# Patient Record
Sex: FEMALE | Race: WHITE | Employment: FULL TIME | URBAN - METROPOLITAN AREA
[De-identification: names, ages, dates, MRNs, and addresses within clinical notes are randomized per-mention and may not be internally consistent; named-entity substitution may affect disease eponyms.]

---

## 2022-08-28 ENCOUNTER — HOSPITAL ENCOUNTER (EMERGENCY)
Age: 52
Discharge: HOME | End: 2022-08-28
Attending: EMERGENCY MEDICINE
Payer: COMMERCIAL

## 2022-08-28 VITALS
SYSTOLIC BLOOD PRESSURE: 137 MMHG | RESPIRATION RATE: 20 BRPM | DIASTOLIC BLOOD PRESSURE: 100 MMHG | HEART RATE: 89 BPM | WEIGHT: 142.8 LBS | OXYGEN SATURATION: 100 %

## 2022-08-28 DIAGNOSIS — S60.449A TIGHT RING ON FINGER: Primary | ICD-10-CM
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
## 2022-08-28 PROCEDURE — 99282 EMERGENCY DEPT VISIT SF MDM: CPT

## 2022-08-28 RX ORDER — LEVOTHYROXINE, LIOTHYRONINE 38; 9 UG/1; UG/1
TABLET ORAL
COMMUNITY
Start: 2022-06-30

## 2022-08-28 RX ORDER — CHOLECALCIFEROL (VITAMIN D3) 125 MCG
CAPSULE ORAL
COMMUNITY
Start: 2022-06-20

## 2022-08-28 ASSESSMENT — PAIN - FUNCTIONAL ASSESSMENT: PAIN_FUNCTIONAL_ASSESSMENT: 0-10

## 2022-08-28 NOTE — ED PROVIDER NOTES
[address]      Pt Name: Sophia Minaya  MRN: [id number]  [place] 1970  Date of evaluation: 8/28/2022  Provider: Royce Dance, MD    CHIEF COMPLAINT       Chief Complaint   Patient presents with    Ring Removal     Pt c/o ring stuck L ring finger. HISTORY OF PRESENT ILLNESS   (Location/Symptom, Timing/Onset, Context/Setting, Quality, Duration, Modifying Factors, Severity)  Note limiting factors. Sophia Minaya is a 46 y.o. female with past medical history ofrelated long-term illness here today after ring was stuck on her finger. Patient was at a nearby grocery store when she placed a ring on her left ring finger. Unfortunately, she was unable to get the ring off and has presented for help and assistance. No significant pain. She notes a mild throbbing in the left ring finger. Denies recent trauma or injury. She states she placed a bag of frozen strawberries on her hand to help with the swelling. \[facility]\""    Nursing Notes were reviewed. REVIEW OF SYSTEMS    (2-9 systems for level 4, 10 or more for level 5)     Review of Systems    Please see HPI for pertinent positive and negative review of system findings. A full 10 system ROS was performed and otherwise negative. PAST MEDICAL HISTORY   History reviewed. No pertinent past medical history. SURGICAL HISTORY     History reviewed. No pertinent surgical history. CURRENT MEDICATIONS       Discharge Medication List as of 8/28/2022  9:28 AM        CONTINUE these medications which have NOT CHANGED    Details   NP THYROID 60 MG tablet TAKE 1 TABLET BY MOUTH EVERY DAY, DAWHistorical Med      Cholecalciferol (VITAMIN D3) 50 MCG (2000 UT) TABS TAKE 1 TABLET BY MOUTH EVERY DAYHistorical Med             ALLERGIES     Patient has no known allergies. FAMILY HISTORY     History reviewed. No pertinent family history.        SOCIAL HISTORY       Social History     Socioeconomic History removed without any trauma to the underlying digit. She has normal range of motion of the great capillary refill and may be discharged home    Monica SANTOYO M.D., am the primary clinician of record. MDM      CONSULTS     None    Critical Care:   None    REASSESSMENT          PROCEDURE     Unless otherwise noted below, none     Procedures      FINAL IMPRESSION      1. Tight ring on finger            DISPOSITION/PLAN   DISPOSITION Decision To Discharge 08/28/2022 09:16:17 AM        PATIENT REFERRED TO:  [facility] [address] [address]  [phone]    As needed      DISCHARGE MEDICATIONS:  Discharge Medication List as of 8/28/2022  9:28 AM        Controlled Substances Monitoring:     No flowsheet data found.     (Please note that portions of this note were completed with a voice recognition program.  Efforts were made to edit the dictations but occasionally words are mis-transcribed.)    Monica Millan MD (electronically signed)  Attending Emergency Physician            Olya Rey MD  08/28/22 1384

## 2023-06-19 ENCOUNTER — OFFICE VISIT (OUTPATIENT)
Dept: ORTHOPEDIC SURGERY | Age: 53
End: 2023-06-19
Payer: COMMERCIAL

## 2023-06-19 ENCOUNTER — TELEPHONE (OUTPATIENT)
Dept: ORTHOPEDIC SURGERY | Age: 53
End: 2023-06-19

## 2023-06-19 VITALS — WEIGHT: 142 LBS

## 2023-06-19 DIAGNOSIS — M25.561 RIGHT KNEE PAIN, UNSPECIFIED CHRONICITY: Primary | ICD-10-CM

## 2023-06-19 PROCEDURE — 99204 OFFICE O/P NEW MOD 45 MIN: CPT | Performed by: ORTHOPAEDIC SURGERY

## 2023-06-19 NOTE — TELEPHONE ENCOUNTER
Same Day Appt Add On Time     Appointment time:  3:15 PM    AWARE TO ARRIVE EARLY  AWARE TO BRING ALL RECORDS & IMAGES IF NOT PATIENT WILL CALL TO RESCHEDULE

## 2023-06-29 ENCOUNTER — OFFICE VISIT (OUTPATIENT)
Dept: ORTHOPEDIC SURGERY | Age: 53
End: 2023-06-29

## 2023-06-29 VITALS — BODY MASS INDEX: 22.29 KG/M2 | HEIGHT: 67 IN | WEIGHT: 142 LBS

## 2023-06-29 DIAGNOSIS — M23.51 OLD COMPLETE TEAR OF ANTERIOR CRUCIATE LIGAMENT OF RIGHT KNEE: Primary | ICD-10-CM

## 2023-07-05 ENCOUNTER — TELEPHONE (OUTPATIENT)
Dept: ORTHOPEDIC SURGERY | Age: 53
End: 2023-07-05

## 2023-07-05 DIAGNOSIS — M25.561 RIGHT KNEE PAIN, UNSPECIFIED CHRONICITY: ICD-10-CM

## 2023-07-05 DIAGNOSIS — M23.51 OLD COMPLETE TEAR OF ANTERIOR CRUCIATE LIGAMENT OF RIGHT KNEE: Primary | ICD-10-CM

## 2023-07-05 NOTE — TELEPHONE ENCOUNTER
September 15TH. FMLA/DISABILITY BENEFITS DO NOT KICK IN UNTIL September. I LET PATIENT KNOW I WOULD CONFIRM THE PLAN WITH DR. Ash Aguilar. SHE STATED SIDRA WOULD MAYBE WANT REPEAT MRI SINCE HAVING TO WAIT. IF SO, SHE WOULD LIKE TO GO TO  IMAGING- [facility]. I LET PATIENT KNOW WE WOULD F/U ON Friday AFTER CONFIRMING WITH DR. Ash Aguilar.

## 2023-07-05 NOTE — TELEPHONE ENCOUNTER
PATIENT LS DR. Alessio Beltran.  SHE WANTS ORDER FOR PT. I WILL PLACE ORDER FOR PT FOR [place] OFFICE. LEFT PATIENT NUMBER FOR PT AT [facility]. ASKED SHE F/U THAT SHE GOT THE PT INFO AND IF SHE WOULD LIKE TO [address] F/U WITH DR. VALENTE. LEFT OUR DIRECT LINE FOR PATIENT TO RETURN CALL.

## 2023-07-07 NOTE — TELEPHONE ENCOUNTER
SPOKE WITH PATIENT. LET HER KNOW WE ARE A GO FOR 9/15. SHE WILL GET HER SX PACKET AT HER PRE-OP APPT ON 8/14. ALL QUESTIONS ANSWERED. PATIENT EXPRESSED UNDERSTANDING.

## 2023-07-10 ENCOUNTER — HOSPITAL ENCOUNTER (OUTPATIENT)
Dept: PHYSICAL THERAPY | Age: 53
Setting detail: THERAPIES SERIES
Discharge: HOME | End: 2023-07-10
Payer: COMMERCIAL

## 2023-07-10 PROCEDURE — 97161 PT EVAL LOW COMPLEX 20 MIN: CPT

## 2023-07-10 PROCEDURE — 97112 NEUROMUSCULAR REEDUCATION: CPT

## 2023-07-10 NOTE — FLOWSHEET NOTE
María Elena Watkins, PT, DPT, SCS    Note: If patient does not return for scheduled/ recommended follow up visits, this note will serve as a discharge from care along with most recent update on progress.

## 2023-07-14 ENCOUNTER — HOSPITAL ENCOUNTER (OUTPATIENT)
Dept: PHYSICAL THERAPY | Age: 53
Setting detail: THERAPIES SERIES
Discharge: HOME | End: 2023-07-14
Payer: COMMERCIAL

## 2023-07-14 PROCEDURE — 97112 NEUROMUSCULAR REEDUCATION: CPT

## 2023-07-14 PROCEDURE — 97110 THERAPEUTIC EXERCISES: CPT

## 2023-07-14 NOTE — FLOWSHEET NOTE
EOT leg extension. She would continue to benefit from skilled physical therapy targeted at maximal quad strength improvements. PLAN: See eval  [] Continue per plan of care [] Alter current plan (see comments above)  [x] Plan of care initiated [] Hold pending MD visit [] Discharge    Electronically signed by:  Veronica Babb, PT, DPT, SCS    Note: If patient does not return for scheduled/ recommended follow up visits, this note will serve as a discharge from care along with most recent update on progress.

## 2023-07-18 ENCOUNTER — HOSPITAL ENCOUNTER (OUTPATIENT)
Dept: PHYSICAL THERAPY | Age: 53
Discharge: HOME | End: 2023-07-18
Payer: COMMERCIAL

## 2023-07-18 PROCEDURE — 97112 NEUROMUSCULAR REEDUCATION: CPT

## 2023-07-18 PROCEDURE — 97110 THERAPEUTIC EXERCISES: CPT

## 2023-07-18 NOTE — FLOWSHEET NOTE
ASSESSMENT: Yoel Manzano tolerated session well. She would continue to benefit from skilled physical therapy targeted at maximal quad strength improvements. PLAN: See eval  [] Continue per plan of care [] Alter current plan (see comments above)  [x] Plan of care initiated [] Hold pending MD visit [] Discharge    Electronically signed by:  Raina Goodwin, PT, DPT, SCS, Stiven Albright SPT    Note: If patient does not return for scheduled/ recommended follow up visits, this note will serve as a discharge from care along with most recent update on progress.

## 2023-07-20 ENCOUNTER — HOSPITAL ENCOUNTER (OUTPATIENT)
Dept: PHYSICAL THERAPY | Age: 53
Setting detail: THERAPIES SERIES
Discharge: HOME | End: 2023-07-20
Payer: COMMERCIAL

## 2023-07-20 PROCEDURE — 97110 THERAPEUTIC EXERCISES: CPT

## 2023-07-20 PROCEDURE — 97112 NEUROMUSCULAR REEDUCATION: CPT

## 2023-07-20 NOTE — FLOWSHEET NOTE
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 tear of anterior cruciate ligament of right knee [M23.51]; Right knee pain, unspecified chronicity [M25.561]         Right knee pain [M25.561]                                        Insurance information: BCBS, 25 hard max, $25 CP  Physician Information:  Yang Zimmerman MD  Has the plan of care been signed (Y/N):        []  Yes  [x]  No     Date of Patient follow up with Physician: 23      Is this a Progress Report:     []  Yes  [x]  No        If Yes:  Date Range for reporting period:  Beginning 7/10/23  Ending    Progress report will be due (10 Rx or 30 days whichever is less): 18       Recertification will be due (POC Duration  / 90 days whichever is less): 10/10/23      Visit # Insurance Allowable Auth Required   In-person 4 (6 previously used) 25 hard max []  Yes [x]  No        Functional Scale: LEFI19/80    Date assessed:  7/10/23      Number of Comorbidities:  [x]0     []1-2    []3+    Latex Allergy:  [x]NO      []YES  Preferred Language for Healthcare:   [x]English       []other:      Pain level:  0/10     SUBJECTIVE:  Pt reports her knee felt good after last session.    OBJECTIVE: See eval  Observation:   Test measurements:    Girth measurement at superior patella 37.5 cm R and 38.0 cm L    RESTRICTIONS/PRECAUTIONS: previous ACLr 2022    Exercises/Interventions:     Therapeutic Ex (77397)/NMR re-education (53124) Sets/sec Notes/CUES   BFR: see below 50'                                                    Manual Intervention

## 2023-07-24 ENCOUNTER — HOSPITAL ENCOUNTER (OUTPATIENT)
Dept: PHYSICAL THERAPY | Age: 53
Setting detail: THERAPIES SERIES
Discharge: HOME | End: 2023-07-24
Payer: COMMERCIAL

## 2023-07-24 PROCEDURE — 97110 THERAPEUTIC EXERCISES: CPT

## 2023-07-24 PROCEDURE — 97112 NEUROMUSCULAR REEDUCATION: CPT

## 2023-07-24 NOTE — FLOWSHEET NOTE
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 tear of anterior cruciate ligament of right knee [M23.51]; Right knee pain, unspecified chronicity [M25.561]         Right knee pain [M25.561]                                        Insurance information: BCBS, 25 hard max, $25 CP  Physician Information:  Salima Ramirez MD  Has the plan of care been signed (Y/N):        []  Yes  [x]  No     Date of Patient follow up with Physician: 23      Is this a Progress Report:     []  Yes  [x]  No        If Yes:  Date Range for reporting period:  Beginning 7/10/23  Ending    Progress report will be due (10 Rx or 30 days whichever is less):        Recertification will be due (POC Duration  / 90 days whichever is less): 10/10/23      Visit # Insurance Allowable Auth Required   In-person 5 (6 previously used) 25 hard max []  Yes [x]  No        Functional Scale: LEFI19/80    Date assessed:  7/10/23      Number of Comorbidities:  [x]0     []1-2    []3+    Latex Allergy:  [x]NO      []YES  Preferred Language for Healthcare:   [x]English       []other:      Pain level:  0/10     SUBJECTIVE:  Pt reports her knee felt good after last session.    OBJECTIVE: See eval  Observation:   Test measurements:    Girth measurement at superior patella 37.5 cm R and 38.0 cm L    RESTRICTIONS/PRECAUTIONS: previous ACLr 2022    Exercises/Interventions:     Therapeutic Ex (69139)/NMR re-education (15155) Sets/sec Notes/CUES   BFR: see below 50'         Straight leg bridge  W/adduction with ball 1x10  1x10 Feet on 2

## 2023-07-27 ENCOUNTER — HOSPITAL ENCOUNTER (OUTPATIENT)
Dept: PHYSICAL THERAPY | Age: 53
Setting detail: THERAPIES SERIES
Discharge: HOME | End: 2023-07-27
Payer: COMMERCIAL

## 2023-07-27 PROCEDURE — 97112 NEUROMUSCULAR REEDUCATION: CPT

## 2023-07-27 PROCEDURE — 97110 THERAPEUTIC EXERCISES: CPT

## 2023-07-27 NOTE — FLOWSHEET NOTE
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 tear of anterior cruciate ligament of right knee [M23.51]; Right knee pain, unspecified chronicity [M25.561]         Right knee pain [M25.561]                                        Insurance information: BCBS, 25 hard max, $25 CP  Physician Information:  Kyleigh Law MD  Has the plan of care been signed (Y/N):        []  Yes  [x]  No     Date of Patient follow up with Physician: 23      Is this a Progress Report:     []  Yes  [x]  No        If Yes:  Date Range for reporting period:  Beginning 7/10/23  Ending    Progress report will be due (10 Rx or 30 days whichever is less): 3/58/16       Recertification will be due (POC Duration  / 90 days whichever is less): 10/10/23      Visit # Insurance Allowable Auth Required   In-person 6 (6 previously used) 25 hard max []  Yes [x]  No        Functional Scale: LEFI19/80    Date assessed:  7/10/23      Number of Comorbidities:  [x]0     []1-2    []3+    Latex Allergy:  [x]NO      []YES  Preferred Language for Healthcare:   [x]English       []other:      Pain level:  0/10     SUBJECTIVE:  Pt reports her knee felt good after last session.    OBJECTIVE: See eval  Observation:   Test measurements:    Girth measurement at superior patella 37.5 cm R and 38.0 cm L    RESTRICTIONS/PRECAUTIONS: previous ACLr 2022    Exercises/Interventions:     Therapeutic Ex (65673)/NMR re-education (28550) Sets/sec Notes/CUES   BFR: see below 50'         Straight leg bridge ( hold 5\")  W/adduction with ball

## 2023-07-31 ENCOUNTER — HOSPITAL ENCOUNTER (OUTPATIENT)
Dept: PHYSICAL THERAPY | Age: 53
Setting detail: THERAPIES SERIES
Discharge: HOME | End: 2023-07-31
Payer: COMMERCIAL

## 2023-07-31 PROCEDURE — 97110 THERAPEUTIC EXERCISES: CPT

## 2023-07-31 PROCEDURE — 97112 NEUROMUSCULAR REEDUCATION: CPT

## 2023-07-31 NOTE — FLOWSHEET NOTE
JESSICA  hip abd B ( R 25# only) 1x10 1 set 25# L  2 set 40# L   JESSICA  glute ext (R 25#) 1x15 1 set 25# L  2 set 40# L   SL bridge * (R in ext) 2x12                             Manual Intervention ([phone])     None of note            HEP instruction: verbally discussed, will formally assess next visit    Blood Flow Restriction Training  Smart Cuff Size:  LOP:  PTP (60-80% of LOP):   Exercise 10 rep Max Repetition Weight Repetitions   NMES (38mA)   8'   SLR*  2# 30, 15, 15, 15   JESSICA hip abd  25# 30, 15, 15, 15   JESSICA Glute EXT 25# 0, 15, 15, 15   BFR protocol with Reps of 30/15/15/15 with 30-60 seconds rest in between sets and cuff depressed between exercises. Cuff pressure set at 60-80% of LOP measured with doppler ultrasound at posterior tibial pulse and/or dorsalis pedis pulse. Patient was fully educated on Blood Flow Restriction (BFR) protocol this visit; this included how to properly don/doff Smart Cuff as well as how to properly inflate Smart Cuff. They were educated about what symptoms to monitor for while performing BFR and were instructed to immediately stop BFR and release pressure if they experience any numbness/tingling in extremity, intense pain beneath cuff, loss of sensation in extremity or feeling of coldness in extremity. The patient has been medically cleared by MD for initiation of BFR therapy and verbal consent was obtained from patient / - patient's guardian prior to initiation of BFR therapy.         Therapeutic Exercise and NMR EXR  [] (78782) Provided verbal/tactile cueing for activities related to strengthening, flexibility, endurance, ROM for improvements in LE, proximal hip, and core control with self care, mobility, lifting, ambulation.  [] (29747) Provided verbal/tactile cueing for activities related to improving balance, coordination, kinesthetic sense, posture, motor skill, proprioception  to assist with LE, proximal hip, and core control in self care, mobility, lifting, ambulation and

## 2023-08-03 ENCOUNTER — APPOINTMENT (OUTPATIENT)
Dept: GENERAL RADIOLOGY | Age: 53
End: 2023-08-03
Payer: COMMERCIAL

## 2023-08-03 ENCOUNTER — HOSPITAL ENCOUNTER (EMERGENCY)
Age: 53
Discharge: HOME | End: 2023-08-03
Attending: STUDENT IN AN ORGANIZED HEALTH CARE EDUCATION/TRAINING PROGRAM
Payer: COMMERCIAL

## 2023-08-03 ENCOUNTER — HOSPITAL ENCOUNTER (OUTPATIENT)
Dept: PHYSICAL THERAPY | Age: 53
Setting detail: THERAPIES SERIES
Discharge: HOME | End: 2023-08-03

## 2023-08-03 VITALS
BODY MASS INDEX: 19.78 KG/M2 | OXYGEN SATURATION: 98 % | SYSTOLIC BLOOD PRESSURE: 132 MMHG | HEIGHT: 67 IN | DIASTOLIC BLOOD PRESSURE: 86 MMHG | HEART RATE: 103 BPM | TEMPERATURE: 98.1 F | RESPIRATION RATE: 20 BRPM | WEIGHT: 126 LBS

## 2023-08-03 DIAGNOSIS — S92.352A CLOSED DISPLACED FRACTURE OF FIFTH METATARSAL BONE OF LEFT FOOT, INITIAL ENCOUNTER: Primary | ICD-10-CM

## 2023-08-03 PROCEDURE — 99283 EMERGENCY DEPT VISIT LOW MDM: CPT

## 2023-08-03 PROCEDURE — 73630 X-RAY EXAM OF FOOT: CPT

## 2023-08-03 ASSESSMENT — PAIN DESCRIPTION - LOCATION: LOCATION: FOOT

## 2023-08-03 ASSESSMENT — PAIN DESCRIPTION - FREQUENCY: FREQUENCY: CONTINUOUS

## 2023-08-03 ASSESSMENT — PAIN DESCRIPTION - PAIN TYPE: TYPE: ACUTE PAIN

## 2023-08-03 ASSESSMENT — PAIN DESCRIPTION - DESCRIPTORS: DESCRIPTORS: ACHING

## 2023-08-03 ASSESSMENT — PAIN DESCRIPTION - ORIENTATION: ORIENTATION: LEFT

## 2023-08-03 ASSESSMENT — PAIN SCALES - GENERAL: PAINLEVEL_OUTOF10: 2

## 2023-08-03 ASSESSMENT — PAIN - FUNCTIONAL ASSESSMENT: PAIN_FUNCTIONAL_ASSESSMENT: 0-10

## 2023-08-03 NOTE — ED TRIAGE NOTES
54y/o female presents to the ED with left foot injury.  Pt states she was in bed this morning, had a syd horse, got up, and fell on left foot. +bruising +swelling +pain 2/10

## 2023-08-03 NOTE — FLOWSHEET NOTE
[facility] and [address] [place] [address] [address],  [facility], [address] [place] [address], [address] [place] [address] Avenue  Phone: [phone]       Fax: [phone]        Physical Therapy  Cancellation/No-show Note  Patient Name:  Baljeet Melendez  :  1970   Date:  8/3/2023  Cancelled visits to date: 1  No-shows to date: 0    For today's appointment patient:  [x]  Cancelled  []  Rescheduled appointment  []  No-show     Reason given by patient:  []  Patient ill  []  Conflicting appointment  []  No transportation    []  Conflict with work  [x]  No reason given  []  Other:     Comments:      Electronically signed by:  Kristen Waldrop, PT, DPT, SCS

## 2023-08-04 ENCOUNTER — TELEPHONE (OUTPATIENT)
Dept: ORTHOPEDIC SURGERY | Age: 53
End: 2023-08-04

## 2023-08-04 ENCOUNTER — OFFICE VISIT (OUTPATIENT)
Dept: ORTHOPEDIC SURGERY | Age: 53
End: 2023-08-04

## 2023-08-04 VITALS — RESPIRATION RATE: 16 BRPM | BODY MASS INDEX: 19.73 KG/M2 | HEIGHT: 67 IN

## 2023-08-04 DIAGNOSIS — S92.352A CLOSED DISPLACED FRACTURE OF FIFTH METATARSAL BONE OF LEFT FOOT, INITIAL ENCOUNTER: Primary | ICD-10-CM

## 2023-08-04 NOTE — TELEPHONE ENCOUNTER
Patient is currently scheduled for right ACL recon in September, but has unfortunately fractured her left 5th metatarsal.  She is WBAT in boot to f/u with Arebi from appt 8/4/23. Injury 8/3/23. She is concerned for WB status after ACL with possible meniscus repair, as she does not f/u with Arebi until 9/13 and her sx is currently on 9/15. I let her know I would relay this info to Arun1 W Jose Veronica and f/u with her early next week with updated plan. All questions answered. Patient expressed understanding.

## 2023-08-04 NOTE — TELEPHONE ENCOUNTER
Other CANCELLATION     PATIENT WOULD LIKE A CALL BACK FROM SOMEONE IN DR ESPARZA'S OFFICE  IF ANY APPOINTMENTS ARE CANCELED     PATIENT ALSO DECLINED PA      PLEASE ADVISE

## 2023-08-07 ENCOUNTER — HOSPITAL ENCOUNTER (OUTPATIENT)
Dept: PHYSICAL THERAPY | Age: 53
Setting detail: THERAPIES SERIES
Discharge: HOME | End: 2023-08-07
Payer: COMMERCIAL

## 2023-08-07 PROCEDURE — 97116 GAIT TRAINING THERAPY: CPT | Performed by: SPECIALIST/TECHNOLOGIST

## 2023-08-07 PROCEDURE — 97112 NEUROMUSCULAR REEDUCATION: CPT | Performed by: SPECIALIST/TECHNOLOGIST

## 2023-08-07 PROCEDURE — 97110 THERAPEUTIC EXERCISES: CPT | Performed by: SPECIALIST/TECHNOLOGIST

## 2023-08-07 NOTE — FLOWSHEET NOTE
[] [facility] Traction (15496)  [] ES(attended) (78317)      [] ES (un) (33173):     GOALS:  Patient stated goal: To get full use of her knee back. [] Progressing: [] Met: [] Not Met: [] Adjusted    Therapist goals for Patient:   Short Term Goals: To be achieved in: 2 weeks  1. Independent in HEP and progression per patient tolerance, in order to prevent re-injury. [] Progressing: [] Met: [] Not Met: [] Adjusted  2. Patient will have a decrease in pain to facilitate improvement in movement, function, and ADLs as indicated by Functional Deficits. [] Progressing: [] Met: [] Not Met: [] Adjusted    Long Term Goals: To be achieved in: 12 weeks  1. Disability index score of 40% or more per LEFS to assist with reaching prior level of function. [] Progressing: [] Met: [] Not Met: [] Adjusted  2. Patient will demonstrate increased AROM to 0-125 to allow for proper joint functioning as indicated by patients Functional Deficits. [] Progressing: [] Met: [] Not Met: [] Adjusted  3. Patient will demonstrate an increase in Strength to good proximal hip strength and control, within 20% as tested by biodex in LE to allow for proper functional mobility as indicated by patients Functional Deficits. [] Progressing: [] Met: [] Not Met: [] Adjusted  4. Patient will return to all ADL's and work related functional activities without increased symptoms or restriction. [] Progressing: [] Met: [] Not Met: [] Adjusted    Overall Progression Towards Functional goals/ Treatment Progress Update:  [] Patient is progressing as expected towards functional goals listed. [] Progression is slowed due to complexities/Impairments listed. [] Progression has been slowed due to co-morbidities.   [x] Plan just implemented, too soon to assess goals progression <30days   [] Goals require adjustment due to lack of progress  [] Patient is not progressing as expected and requires additional follow up with physician  [] Other    Prognosis for POC: [x]

## 2023-08-07 NOTE — TELEPHONE ENCOUNTER
SPOKE WITH PATIENT. WE WILL MOST LIKELY POSTPONE SX. DR. Juvenal Coombs AGREES WITH THIS PLAN. WE WILL SE PATIENT ST HER NEXT SCHEDULED APPT ON 8/14 AND WE CAN DISCUSS [address] HER SX AT THAT TIME. PATIENT IS FINE WITH THIS PLAN. ALL QUESTIONS ANSWERED.

## 2023-08-08 ENCOUNTER — TELEPHONE (OUTPATIENT)
Dept: ORTHOPEDIC SURGERY | Age: 53
End: 2023-08-08

## 2023-08-08 NOTE — TELEPHONE ENCOUNTER
General Question     Subject: patient call and she just have some questions regarding  her care she just need a call back.    Patient Colletta Moorman  Contact Number: [phone]

## 2023-08-10 ENCOUNTER — HOSPITAL ENCOUNTER (OUTPATIENT)
Dept: PHYSICAL THERAPY | Age: 53
Setting detail: THERAPIES SERIES
Discharge: HOME | End: 2023-08-10
Payer: COMMERCIAL

## 2023-08-10 PROCEDURE — 97112 NEUROMUSCULAR REEDUCATION: CPT | Performed by: SPECIALIST/TECHNOLOGIST

## 2023-08-10 PROCEDURE — 97110 THERAPEUTIC EXERCISES: CPT | Performed by: SPECIALIST/TECHNOLOGIST

## 2023-08-10 NOTE — FLOWSHEET NOTE
Treatment Minutes: 50     [] EVAL (LOW) 36094   [] EVAL (MOD) 63940  [] EVAL (HIGH) 27994   [] RE-EVAL     [x] HT(52775) x2     [] IONTO  [x] NMR (00070) x 1    [] VASO  [] Manual (05716) x      [] Other:  Gait training   [] TA x      [] Mech Traction (26141)  [] ES(attended) (02351)      [] ES (un) (84589):     GOALS:  Patient stated goal: To get full use of her knee back. [] Progressing: [] Met: [] Not Met: [] Adjusted    Therapist goals for Patient:   Short Term Goals: To be achieved in: 2 weeks  1. Independent in HEP and progression per patient tolerance, in order to prevent re-injury. [] Progressing: [] Met: [] Not Met: [] Adjusted  2. Patient will have a decrease in pain to facilitate improvement in movement, function, and ADLs as indicated by Functional Deficits. [] Progressing: [] Met: [] Not Met: [] Adjusted    Long Term Goals: To be achieved in: 12 weeks  1. Disability index score of 40% or more per LEFS to assist with reaching prior level of function. [] Progressing: [] Met: [] Not Met: [] Adjusted  2. Patient will demonstrate increased AROM to 0-125 to allow for proper joint functioning as indicated by patients Functional Deficits. [] Progressing: [] Met: [] Not Met: [] Adjusted  3. Patient will demonstrate an increase in Strength to good proximal hip strength and control, within 20% as tested by biodex in LE to allow for proper functional mobility as indicated by patients Functional Deficits. [] Progressing: [] Met: [] Not Met: [] Adjusted  4. Patient will return to all ADL's and work related functional activities without increased symptoms or restriction. [] Progressing: [] Met: [] Not Met: [] Adjusted    Overall Progression Towards Functional goals/ Treatment Progress Update:  [] Patient is progressing as expected towards functional goals listed. [] Progression is slowed due to complexities/Impairments listed. [] Progression has been slowed due to co-morbidities.   [x] Plan just

## 2023-08-15 ENCOUNTER — HOSPITAL ENCOUNTER (OUTPATIENT)
Dept: PHYSICAL THERAPY | Age: 53
Setting detail: THERAPIES SERIES
Discharge: HOME | End: 2023-08-15
Payer: COMMERCIAL

## 2023-08-15 PROCEDURE — 97110 THERAPEUTIC EXERCISES: CPT

## 2023-08-15 PROCEDURE — 97112 NEUROMUSCULAR REEDUCATION: CPT

## 2023-08-15 NOTE — FLOWSHEET NOTE
[facility] and [address] [place] [address] [address],  [facility], [address] [place] [address]                  200 [facility], [address] [place] [address] Avenue  Phone: [phone]               Fax: [phone]    Physical Therapy Treatment Note/ Progress Report:           Date:  8/15/2023    Patient Name:  Severo Mendoza    :  1970  MRN: [id number]  Restrictions/Precautions:    Medical/Treatment Diagnosis Information:  Old complete tear of anterior cruciate ligament of right knee [M23.51]; Right knee pain, unspecified chronicity [M25.561]         Right knee pain [M25.561]                                        Insurance information: BCBS, 25 hard max, $25 CP  Physician Information:  Shi Durant MD  Has the plan of care been signed (Y/N):        []  Yes  [x]  No     Date of Patient follow up with Physician: 23      Is this a Progress Report:     [x]  Yes  []  No        If Yes:  Date Range for reporting period:  Beginning 7/10/23  Ending 8/15/23    Progress report will be due (10 Rx or 30 days whichever is less):        Recertification will be due (POC Duration  / 90 days whichever is less): 10/10/23      Visit # Insurance Allowable Auth Required   In-person 9 (6 previously used) 25 hard max []  Yes [x]  No        Functional Scale: LEFI     Date assessed:  7/10/23           LEFI     Date assessed:  8/15/23  Number of Comorbidities:  [x]0     []1-2    []3+    Latex Allergy:  [x]NO      []YES  Preferred Language for Healthcare:   [x]English       []other:      Pain level:  2/10     SUBJECTIVE:  Pt reports her knee felt good after last session. She sustained a fracture of her left foot recently and is challenged with not bending her right knee during ambulation due to the boot. She says she has neglected her HEP recently due to her move to a new residence and she feels the knee is more unstable and painful.      OBJECTIVE: See eval  Observation:

## 2023-08-17 ENCOUNTER — HOSPITAL ENCOUNTER (OUTPATIENT)
Dept: PHYSICAL THERAPY | Age: 53
Setting detail: THERAPIES SERIES
Discharge: HOME | End: 2023-08-17
Payer: COMMERCIAL

## 2023-08-17 PROCEDURE — 97112 NEUROMUSCULAR REEDUCATION: CPT

## 2023-08-17 PROCEDURE — 97110 THERAPEUTIC EXERCISES: CPT

## 2023-08-17 NOTE — FLOWSHEET NOTE
proprioception  to assist with LE, proximal hip, and core control in self care, mobility, lifting, ambulation and eccentric single leg control. NMR and Therapeutic Activities:    [x] (42324 or 46503) Provided verbal/tactile cueing for activities related to improving balance, coordination, kinesthetic sense, posture, motor skill, proprioception and motor activation to allow for proper function of core, proximal hip and LE with self care and ADLs  [] (88812) Gait Re-education- Provided training and instruction to the patient for proper LE, core and proximal hip recruitment and positioning and eccentric body weight control with ambulation re-education including up and down stairs     Home Exercise Program:    [x] (52622) Reviewed/Progressed HEP activities related to strengthening, flexibility, endurance, ROM of core, proximal hip and LE for functional self-care, mobility, lifting and ambulation/stair navigation   [] (11133)Reviewed/Progressed HEP activities related to improving balance, coordination, kinesthetic sense, posture, motor skill, proprioception of core, proximal hip and LE for self care, mobility, lifting, and ambulation/stair navigation      Manual Treatments:  PROM / STM / Oscillations-Mobs:  G-I, II, III, IV (PA's, Inf., Post.)  [] (09088) Provided manual therapy to mobilize LE, proximal hip and/or LS spine soft tissue/joints for the purpose of modulating pain, promoting relaxation,  increasing ROM, reducing/eliminating soft tissue swelling/inflammation/restriction, improving soft tissue extensibility and allowing for proper ROM for normal function with self care, mobility, lifting and ambulation.      Modalities:        Charges  Timed Code Treatment Minutes: 30   Total Treatment Minutes: 30     [] EVAL (LOW) 43856   [] EVAL (MOD) 08787  [] EVAL (HIGH) 53776   [] RE-EVAL     [x] OY(30494) x1     [] IONTO  [x] NMR (16016) x 1    [] VASO  [] Manual (68058) x      [] Other:  Gait training   [] TA x

## 2023-08-21 ENCOUNTER — TELEPHONE (OUTPATIENT)
Dept: ORTHOPEDIC SURGERY | Age: 53
End: 2023-08-21

## 2023-08-21 ENCOUNTER — HOSPITAL ENCOUNTER (OUTPATIENT)
Dept: PHYSICAL THERAPY | Age: 53
Setting detail: THERAPIES SERIES
End: 2023-08-21
Payer: COMMERCIAL

## 2023-08-21 NOTE — TELEPHONE ENCOUNTER
SPOKE WITH PATIENT. SHE IS IN MIDDLE OF MOVING. SHE WILL CALL US BACK TO [address] [place] [address] Drive HER APPT. SHE MAY [address] F/U APPT WITH DR. VALENTE WITH [facility] WHEN RETURNS CALL.

## 2023-08-22 ENCOUNTER — TELEPHONE (OUTPATIENT)
Dept: ORTHOPEDIC SURGERY | Age: 53
End: 2023-08-22

## 2023-08-22 NOTE — TELEPHONE ENCOUNTER
LM for pt we will cancel sx for 9/15. We can r/s surgery when she returns in office on 10/9. Sx cancelled at hospital.     She request letter for refraining from steps. Letter in chart as requested.

## 2023-08-24 ENCOUNTER — HOSPITAL ENCOUNTER (OUTPATIENT)
Dept: PHYSICAL THERAPY | Age: 53
Setting detail: THERAPIES SERIES
Discharge: HOME | End: 2023-08-24
Payer: COMMERCIAL

## 2023-08-24 PROCEDURE — 97110 THERAPEUTIC EXERCISES: CPT

## 2023-08-24 PROCEDURE — 97112 NEUROMUSCULAR REEDUCATION: CPT

## 2023-08-24 NOTE — FLOWSHEET NOTE
Ex (91388)/NMR re-education (31673) Sets/sec Notes/CUES   BFR: see below 50'         Straight leg bridge ( hold 5\")  x5  1x10 Feet on 1 AirEx pads   JESSICA  hip abd Lonly  2x12 40#           modification 20'              Manual Intervention (78482)     None of note            HEP instruction: verbally discussed, will formally assess next visit    Blood Flow Restriction Training  Smart Cuff Size:  LOP:  PTP (60-80% of LOP):   Exercise 10 rep Max Repetition Weight Repetitions   NMES    8'   SLR  0# 30, 15, 15, 15   JESSICA hip abd  25# 30, 15, 15, 15   JESSICA Glute EXT 25# 0, 15, 15, 15   BFR protocol with Reps of 30/15/15/15 with 30-60 seconds rest in between sets and cuff depressed between exercises. Cuff pressure set at 60-80% of LOP measured with doppler ultrasound at posterior tibial pulse and/or dorsalis pedis pulse. Patient was fully educated on Blood Flow Restriction (BFR) protocol this visit; this included how to properly don/doff Smart Cuff as well as how to properly inflate Smart Cuff. They were educated about what symptoms to monitor for while performing BFR and were instructed to immediately stop BFR and release pressure if they experience any numbness/tingling in extremity, intense pain beneath cuff, loss of sensation in extremity or feeling of coldness in extremity. The patient has been medically cleared by MD for initiation of BFR therapy and verbal consent was obtained from patient / - patient's guardian prior to initiation of BFR therapy.         Therapeutic Exercise and NMR EXR  [x] (06286) Provided verbal/tactile cueing for activities related to strengthening, flexibility, endurance, ROM for improvements in LE, proximal hip, and core control with self care, mobility, lifting, ambulation.  [] (64715) Provided verbal/tactile cueing for activities related to improving balance, coordination, kinesthetic sense, posture, motor skill, proprioception  to assist with LE, proximal hip, and core control in self

## 2023-08-28 ENCOUNTER — HOSPITAL ENCOUNTER (OUTPATIENT)
Dept: PHYSICAL THERAPY | Age: 53
Setting detail: THERAPIES SERIES
Discharge: HOME | End: 2023-08-28
Payer: COMMERCIAL

## 2023-08-28 PROCEDURE — 97110 THERAPEUTIC EXERCISES: CPT

## 2023-08-28 PROCEDURE — 97112 NEUROMUSCULAR REEDUCATION: CPT

## 2023-08-28 NOTE — FLOWSHEET NOTE
11/2022    Exercises/Interventions:     Therapeutic Ex (45741)/NMR re-education (53346) Sets/sec Notes/CUES   BFR: see below 50'         3x10 25#   Straight leg bridge ( hold 5\")  x5  1x10 Feet on 1 AirEx pads   JESSICA  hip abd Lonly  2x12 40#   JESSICA  glute ext Lonly  2x12  40#   2x10 5 reps         modification 20'              Manual Intervention (68545)     None of note            HEP instruction: verbally discussed, will formally assess next visit    Blood Flow Restriction Training  Smart Cuff Size:  LOP:  PTP (60-80% of LOP):   Exercise 10 rep Max Repetition Weight Repetitions   NMES    8'   SLR  0# 30, 15, 15, 15   JESSICA hip abd  25# 30, 15, 15, 15   JESSICA Glute EXT 25# 0, 15, 15, 15   BFR protocol with Reps of 30/15/15/15 with 30-60 seconds rest in between sets and cuff depressed between exercises. Cuff pressure set at 60-80% of LOP measured with doppler ultrasound at posterior tibial pulse and/or dorsalis pedis pulse. Patient was fully educated on Blood Flow Restriction (BFR) protocol this visit; this included how to properly don/doff Smart Cuff as well as how to properly inflate Smart Cuff. They were educated about what symptoms to monitor for while performing BFR and were instructed to immediately stop BFR and release pressure if they experience any numbness/tingling in extremity, intense pain beneath cuff, loss of sensation in extremity or feeling of coldness in extremity. The patient has been medically cleared by MD for initiation of BFR therapy and verbal consent was obtained from patient / - patient's guardian prior to initiation of BFR therapy.         Therapeutic Exercise and NMR EXR  [x] (49109) Provided verbal/tactile cueing for activities related to strengthening, flexibility, endurance, ROM for improvements in LE, proximal hip, and core control with self care, mobility, lifting, ambulation.  [] (44806) Provided verbal/tactile cueing for activities related to improving balance, coordination,

## 2023-08-31 ENCOUNTER — HOSPITAL ENCOUNTER (OUTPATIENT)
Dept: PHYSICAL THERAPY | Age: 53
Setting detail: THERAPIES SERIES
Discharge: HOME | End: 2023-08-31
Payer: COMMERCIAL

## 2023-08-31 PROCEDURE — 97112 NEUROMUSCULAR REEDUCATION: CPT

## 2023-08-31 NOTE — FLOWSHEET NOTE
Poor      Patient requires continued skilled intervention: [x] Yes  [] No    Treatment/Activity Tolerance:  [x] Patient able to complete treatment  [] Patient limited by fatigue  [] Patient limited by pain    [] Patient limited by other medical complications  [] Other:     ASSESSMENT: Katie Mcconnell tolerated session well. She continues to benefit from the BFR exercises. Her activation of her quad seems to remain consistent with the BFR exercises while she is waiting for the repair of her ACL. She would continue to benefit from skilled physical therapy targeted at maximal quad strength improvements. PLAN: See eval  [] Continue per plan of care [] Alter current plan (see comments above)  [x] Plan of care initiated [] Hold pending MD visit [] Discharge    Electronically signed by:  Maribel Russell SPT, Tanya Davila PT, DPT, SCS   Therapist was present, directed the patient's care, made skilled judgement, and was responsible for assessment and treatment of the patient. Note: If patient does not return for scheduled/ recommended follow up visits, this note will serve as a discharge from care along with most recent update on progress.

## 2023-09-05 ENCOUNTER — HOSPITAL ENCOUNTER (OUTPATIENT)
Dept: PHYSICAL THERAPY | Age: 53
Setting detail: THERAPIES SERIES
Discharge: HOME | End: 2023-09-05

## 2023-09-05 PROCEDURE — 97112 NEUROMUSCULAR REEDUCATION: CPT

## 2023-09-05 NOTE — FLOWSHEET NOTE
[facility] and [address] [place] [address] [address],  [facility], [address] [place] [address]                  [address]  Phone: [phone]               Fax: [phone]    Physical Therapy Treatment Note/ Progress Report:           Date:  2023    Patient Name:  Baljeet Melendez    :  1970  MRN: [id number]  Restrictions/Precautions:    Medical/Treatment Diagnosis Information:  Old complete tear of anterior cruciate ligament of right knee [M23.51];  Right knee pain, unspecified chronicity [M25.561]         Right knee pain [M25.561]                                        Insurance information: BCBS, 25 hard max, $25 CP  Physician Information:  Jet Monte MD  Has the plan of care been signed (Y/N):        []  Yes  [x]  No     Date of Patient follow up with Physician: 23      Is this a Progress Report:     [x]  Yes  []  No        If Yes:  Date Range for reporting period:  Beginning 8/15/23  Ending     Progress report will be due (10 Rx or 30 days whichever is less): 3/22/15       Recertification will be due (POC Duration  / 90 days whichever is less): 10/10/23      Visit # Insurance Allowable Auth Required   In-person 15 (6 previously used) 25 hard max []  Yes [x]  No        Functional Scale: LEFI     Date assessed:  7/10/23           LEFI     Date assessed:  8/15/23    Number of Comorbidities:  [x]0     []1-2    []3+    Latex Allergy:  [x]NO      []YES  Preferred Language for Healthcare:   [x]English       []other:      Pain level:  2/10     SUBJECTIVE:. Ene Morales says she is feeling pretty good overall, no new issues    OBJECTIVE: See eval  Observation:   Test measurements:    Girth measurement at superior patella 37.5 cm R and 38.0 cm L    RESTRICTIONS/PRECAUTIONS: HIGH ESTIMATE PATIENT previous ACLr 2022    Exercises/Interventions:     Therapeutic Ex (08508)/NMR re-education (21940) Sets/sec Notes/CUES   BFR:

## 2023-09-07 ENCOUNTER — HOSPITAL ENCOUNTER (OUTPATIENT)
Dept: PHYSICAL THERAPY | Age: 53
Setting detail: THERAPIES SERIES
Discharge: HOME | End: 2023-09-07

## 2023-09-07 PROCEDURE — 97112 NEUROMUSCULAR REEDUCATION: CPT

## 2023-09-07 NOTE — FLOWSHEET NOTE
[] VASO  [] Manual (08134) x      [] Other:  Gait training   [] TA x      [] Mech Traction (98796)  [] ES(attended) (98146)      [] ES (un) (94776):     GOALS:  Patient stated goal: To get full use of her knee back. [x] Progressing: [] Met: [] Not Met: [] Adjusted    Therapist goals for Patient:   Short Term Goals: To be achieved in: 2 weeks  1. Independent in HEP and progression per patient tolerance, in order to prevent re-injury. [x] Progressing: [] Met: [] Not Met: [] Adjusted  2. Patient will have a decrease in pain to facilitate improvement in movement, function, and ADLs as indicated by Functional Deficits. [x] Progressing: [] Met: [] Not Met: [] Adjusted    Long Term Goals: To be achieved in: 12 weeks  1. Disability index score of 40% or more per LEFS to assist with reaching prior level of function. [x] Progressing: [] Met: [] Not Met: [] Adjusted  2. Patient will demonstrate increased AROM to 0-125 to allow for proper joint functioning as indicated by patients Functional Deficits. [x] Progressing: [] Met: [] Not Met: [] Adjusted  3. Patient will demonstrate an increase in Strength to good proximal hip strength and control, within 20% as tested by biodex in LE to allow for proper functional mobility as indicated by patients Functional Deficits. [] Progressing: [] Met: [] Not Met: [] Adjusted  4. Patient will return to all ADL's and work related functional activities without increased symptoms or restriction. [x] Progressing: [] Met: [] Not Met: [] Adjusted    Overall Progression Towards Functional goals/ Treatment Progress Update:  [x] Patient is progressing as expected towards functional goals listed. [] Progression is slowed due to complexities/Impairments listed. [] Progression has been slowed due to co-morbidities.   [] Plan just implemented, too soon to assess goals progression <30days   [] Goals require adjustment due to lack of progress  [] Patient is not progressing as expected and

## 2023-09-11 ENCOUNTER — HOSPITAL ENCOUNTER (OUTPATIENT)
Dept: PHYSICAL THERAPY | Age: 53
Setting detail: THERAPIES SERIES
Discharge: HOME | End: 2023-09-11

## 2023-09-11 PROCEDURE — 97112 NEUROMUSCULAR REEDUCATION: CPT

## 2023-09-11 NOTE — FLOWSHEET NOTE
ACLr 11/2022    Exercises/Interventions:     Therapeutic Ex (06743)/NMR re-education (58809) Sets/sec Notes/CUES   BFR: see below 50'         3x10 25#   Straight leg bridge (hold 10\")  x5  1x10 Feet on 4\" box + foam   JESSICA hip abd L only  3x15 # 55   JESSICA glute ext Lonly  3x15 # 55   2x10 5 reps         modification 20'              Manual Intervention (20326)     None of note            HEP instruction: verbally discussed, will formally assess next visit    Blood Flow Restriction Training  Smart Cuff Size:  LOP:  PTP (60-80% of LOP):   Exercise 10 rep Max Repetition Weight Repetitions   NMES ( 53mA)   8'   SLR  2# 30, 15, 15, 15   JESSICA hip abd  40# 30, 15, 15, 15   JESSICA Glute EXT 40# 0, 15, 15, 15   BFR protocol with Reps of 30/15/15/15 with 30-60 seconds rest in between sets and cuff depressed between exercises. Cuff pressure set at 60-80% of LOP measured with doppler ultrasound at posterior tibial pulse and/or dorsalis pedis pulse. Patient was fully educated on Blood Flow Restriction (BFR) protocol this visit; this included how to properly don/doff Smart Cuff as well as how to properly inflate Smart Cuff. They were educated about what symptoms to monitor for while performing BFR and were instructed to immediately stop BFR and release pressure if they experience any numbness/tingling in extremity, intense pain beneath cuff, loss of sensation in extremity or feeling of coldness in extremity. The patient has been medically cleared by MD for initiation of BFR therapy and verbal consent was obtained from patient / - patient's guardian prior to initiation of BFR therapy.         Therapeutic Exercise and NMR EXR  [x] (64573) Provided verbal/tactile cueing for activities related to strengthening, flexibility, endurance, ROM for improvements in LE, proximal hip, and core control with self care, mobility, lifting, ambulation.  [] (28605) Provided verbal/tactile cueing for activities related to improving balance,

## 2023-09-13 ENCOUNTER — OFFICE VISIT (OUTPATIENT)
Dept: ORTHOPEDIC SURGERY | Age: 53
End: 2023-09-13

## 2023-09-13 VITALS — BODY MASS INDEX: 19.78 KG/M2 | WEIGHT: 126 LBS | HEIGHT: 67 IN

## 2023-09-13 DIAGNOSIS — S92.352A CLOSED DISPLACED FRACTURE OF FIFTH METATARSAL BONE OF LEFT FOOT, INITIAL ENCOUNTER: Primary | ICD-10-CM

## 2023-09-13 PROCEDURE — 99024 POSTOP FOLLOW-UP VISIT: CPT | Performed by: NURSE PRACTITIONER

## 2023-09-13 PROCEDURE — APPNB15 APP NON BILLABLE TIME 0-15 MINS: Performed by: NURSE PRACTITIONER

## 2023-09-13 NOTE — PROGRESS NOTES
CHIEF COMPLAINT: Left foot pain/ 5th MT shaft minimally displaced fracture. DATE OF INJURY:  8/3/2023, DOT 8/4/2023    HISTORY:  Ms. Blanka Miranda 45 y.o.   female  presents today for follow-up visit for evaluation of a left foot injury which occurred when she fell. She is complaining of lateral foot pain and swelling that are improved. Rates pain a 0/10 VAS. She has been weightbearing in a boot but is hesitant to put weight on her foot. No other complaint. She was seen 1st at [facility], where she was x-rayed and splinted and asked to f/u with orthopaedics. She is a NP for 10 years. She will have a revision ACL on 9/15/2023 by Dr Shannan Vega after was done at [facility]. No past medical history on file. Past Surgical History:   Procedure Laterality Date    KNEE ARTHROSCOPY W/ ACL RECONSTRUCTION Right        Social History     Socioeconomic History    Marital status: Single     Spouse name: Not on file    Number of children: Not on file    Years of education: Not on file    Highest education level: Not on file   Occupational History    Occupation: nurse practitioner     Employer: [employer]   Tobacco Use    Smoking status: Never    Smokeless tobacco: Never   Substance and Sexual Activity    Alcohol use: Never    Drug use: Never    Sexual activity: Not on file   Other Topics Concern    Not on file   Social History Narrative    Not on file     Social Determinants of Health     Financial Resource Strain: Not on file   Food Insecurity: Not on file   Transportation Needs: Not on file   Physical Activity: Not on file   Stress: Not on file   Social Connections: Not on file   Intimate Partner Violence: Not on file   Housing Stability: Not on file       No family history on file.     Current Outpatient Medications on File Prior to Visit   Medication Sig Dispense Refill    NP THYROID 60 MG tablet TAKE 1 TABLET BY MOUTH EVERY DAY      Cholecalciferol (VITAMIN D3) 50 MCG (2000 UT) TABS TAKE 1 TABLET BY MOUTH EVERY DAY       No

## 2023-09-14 ENCOUNTER — HOSPITAL ENCOUNTER (OUTPATIENT)
Dept: PHYSICAL THERAPY | Age: 53
Setting detail: THERAPIES SERIES
Discharge: HOME | End: 2023-09-14

## 2023-09-14 NOTE — FLOWSHEET NOTE
verbal/tactile cueing for activities related to improving balance, coordination, kinesthetic sense, posture, motor skill, proprioception  to assist with LE, proximal hip, and core control in self care, mobility, lifting, ambulation and eccentric single leg control. NMR and Therapeutic Activities:    [x] (73408 or 12997) Provided verbal/tactile cueing for activities related to improving balance, coordination, kinesthetic sense, posture, motor skill, proprioception and motor activation to allow for proper function of core, proximal hip and LE with self care and ADLs  [] (74325) Gait Re-education- Provided training and instruction to the patient for proper LE, core and proximal hip recruitment and positioning and eccentric body weight control with ambulation re-education including up and down stairs     Home Exercise Program:    [x] (67488) Reviewed/Progressed HEP activities related to strengthening, flexibility, endurance, ROM of core, proximal hip and LE for functional self-care, mobility, lifting and ambulation/stair navigation   [] (10940)Reviewed/Progressed HEP activities related to improving balance, coordination, kinesthetic sense, posture, motor skill, proprioception of core, proximal hip and LE for self care, mobility, lifting, and ambulation/stair navigation      Manual Treatments:  PROM / STM / Oscillations-Mobs:  G-I, II, III, IV (PA's, Inf., Post.)  [] (16899) Provided manual therapy to mobilize LE, proximal hip and/or LS spine soft tissue/joints for the purpose of modulating pain, promoting relaxation,  increasing ROM, reducing/eliminating soft tissue swelling/inflammation/restriction, improving soft tissue extensibility and allowing for proper ROM for normal function with self care, mobility, lifting and ambulation.      Charges  Timed Code Treatment Minutes: 15   Total Treatment Minutes: 45     HIGH ESTIMATE PATIENT    [] AL(16178) x      [] IONTO  [x] NMR (64249) x 1    [] VASO  [] Manual (68315) x

## 2023-09-18 ENCOUNTER — HOSPITAL ENCOUNTER (OUTPATIENT)
Dept: PHYSICAL THERAPY | Age: 53
Setting detail: THERAPIES SERIES
Discharge: HOME | End: 2023-09-18

## 2023-09-18 PROCEDURE — 97112 NEUROMUSCULAR REEDUCATION: CPT

## 2023-09-18 NOTE — FLOWSHEET NOTE
kinesthetic sense, posture, motor skill, proprioception  to assist with LE, proximal hip, and core control in self care, mobility, lifting, ambulation and eccentric single leg control. NMR and Therapeutic Activities:    [x] (91262 or 99337) Provided verbal/tactile cueing for activities related to improving balance, coordination, kinesthetic sense, posture, motor skill, proprioception and motor activation to allow for proper function of core, proximal hip and LE with self care and ADLs  [] (27149) Gait Re-education- Provided training and instruction to the patient for proper LE, core and proximal hip recruitment and positioning and eccentric body weight control with ambulation re-education including up and down stairs     Home Exercise Program:    [x] (28108) Reviewed/Progressed HEP activities related to strengthening, flexibility, endurance, ROM of core, proximal hip and LE for functional self-care, mobility, lifting and ambulation/stair navigation   [] (77309)Reviewed/Progressed HEP activities related to improving balance, coordination, kinesthetic sense, posture, motor skill, proprioception of core, proximal hip and LE for self care, mobility, lifting, and ambulation/stair navigation      Manual Treatments:  PROM / STM / Oscillations-Mobs:  G-I, II, III, IV (PA's, Inf., Post.)  [] (21425) Provided manual therapy to mobilize LE, proximal hip and/or LS spine soft tissue/joints for the purpose of modulating pain, promoting relaxation,  increasing ROM, reducing/eliminating soft tissue swelling/inflammation/restriction, improving soft tissue extensibility and allowing for proper ROM for normal function with self care, mobility, lifting and ambulation.      Charges  Timed Code Treatment Minutes: 15   Total Treatment Minutes: 45     HIGH ESTIMATE PATIENT    [] RP(24289) x      [] IONTO  [x] NMR (38639) x 1    [] VASO  [] Manual (11350) x      [] Other:  Gait training   [] TA x      [] Mech Traction (55934)  []

## 2023-09-20 ENCOUNTER — TELEPHONE (OUTPATIENT)
Dept: ORTHOPEDIC SURGERY | Age: 53
End: 2023-09-20

## 2023-09-21 ENCOUNTER — HOSPITAL ENCOUNTER (OUTPATIENT)
Dept: PHYSICAL THERAPY | Age: 53
Setting detail: THERAPIES SERIES
Discharge: HOME | End: 2023-09-21

## 2023-09-21 PROCEDURE — 97112 NEUROMUSCULAR REEDUCATION: CPT

## 2023-09-21 NOTE — FLOWSHEET NOTE
[facility] and [address] [place] [address] [address],  [facility], [address] [place] [address]                  [address]  Phone: [phone]               Fax: [phone]    Physical Therapy Treatment Note/ Progress Report:           Date:  2023    Patient Name:  Chandu Lane    :  1970  MRN: [id number]  Restrictions/Precautions:    Medical/Treatment Diagnosis Information:  Old complete tear of anterior cruciate ligament of right knee [M23.51];  Right knee pain, unspecified chronicity [M25.561]         Right knee pain [M25.561]                                        Insurance information: BCBS, 25 hard max, $25 CP  Physician Information:  Jr Benedict MD  Has the plan of care been signed (Y/N):        [x]  Yes  [x]  No     Date of Patient follow up with Physician: 23      Is this a Progress Report:     [x]  Yes  []  No        If Yes:  Date Range for reporting period:  Beginning 8/15/23  Ending 23    Progress report will be due (10 Rx or 30 days whichever is less):        Recertification will be due (POC Duration  / 90 days whichever is less): 10/10/23      Visit # Insurance Allowable Auth Required   In-person 19 (6 previously used) 25 hard max []  Yes [x]  No        Functional Scale: LEFI     Date assessed:  7/10/23           LEFI     Date assessed:  8/15/23         LEFI     Date assessed:  23      Number of Comorbidities:  [x]0     []1-2    []3+    Latex Allergy:  [x]NO      []YES  Preferred Language for Healthcare:   [x]English       []other:      Pain level:  0/10     SUBJECTIVE:. Candy Jett says no pain or changes in the knee     OBJECTIVE: See eval  Observation:   Test measurements:    Girth measurement at superior patella 37.5 cm R and 38.0 cm L    RESTRICTIONS/PRECAUTIONS: HIGH ESTIMATE PATIENT previous ACLr 2022    Exercises/Interventions:     Therapeutic Ex (35969)/NMR

## 2023-09-25 ENCOUNTER — HOSPITAL ENCOUNTER (OUTPATIENT)
Dept: PHYSICAL THERAPY | Age: 53
Setting detail: THERAPIES SERIES
Discharge: HOME | End: 2023-09-25

## 2023-09-25 PROCEDURE — 97112 NEUROMUSCULAR REEDUCATION: CPT

## 2023-09-25 NOTE — FLOWSHEET NOTE
[facility] and [address] [place] [address] [address],  [facility], [address] [place] [address] Avenue  Phone: [phone]               Fax: [phone]    Physical Therapy Treatment Note/ Progress Report:           Date:  2023    Patient Name:  Sharon Avitia    :  1970  MRN: [id number]  Restrictions/Precautions:    Medical/Treatment Diagnosis Information:  Old complete tear of anterior cruciate ligament of right knee [M23.51]; Right knee pain, unspecified chronicity [M25.561]         Right knee pain [M25.561]                                        Insurance information: BCBS, 25 hard max, $25 CP  Physician Information:  Melissa Dunlap MD  Has the plan of care been signed (Y/N):        [x]  Yes  []  No     Date of Patient follow up with Physician: 23      Is this a Progress Report:     []  Yes  [x]  No        If Yes:  Date Range for reporting period:  Beginning 23  Ending     Progress report will be due (10 Rx or 30 days whichever is less):        Recertification will be due (POC Duration  / 90 days whichever is less): 10/10/23      Visit # Insurance Allowable Auth Required   In-person 20 (6 previously used) 25 hard max []  Yes [x]  No        Functional Scale: LEFI     Date assessed:  7/10/23           LEFI     Date assessed:  8/15/23         LEFI     Date assessed:  23      Number of Comorbidities:  [x]0     []1-2    []3+    Latex Allergy:  [x]NO      []YES  Preferred Language for Healthcare:   [x]English       []other:      Pain level:  0/10     SUBJECTIVE:.  Pt reports no pain or changes in the knee     OBJECTIVE: See eval  Observation:   Test measurements:      RESTRICTIONS/PRECAUTIONS: HIGH ESTIMATE PATIENT previous ACLr 2022    Exercises/Interventions:     Therapeutic Ex (86891)/NMR re-education (14161) Sets/sec Notes/CUES   BFR: see below 50'         3x10 25#

## 2023-09-28 ENCOUNTER — HOSPITAL ENCOUNTER (OUTPATIENT)
Dept: PHYSICAL THERAPY | Age: 53
Setting detail: THERAPIES SERIES
Discharge: HOME | End: 2023-09-28

## 2023-09-28 PROCEDURE — 97112 NEUROMUSCULAR REEDUCATION: CPT

## 2023-09-28 NOTE — FLOWSHEET NOTE
Term Goals: To be achieved in: 2 weeks  1. Independent in HEP and progression per patient tolerance, in order to prevent re-injury. [] Progressing: [x] Met: [] Not Met: [] Adjusted  2. Patient will have a decrease in pain to facilitate improvement in movement, function, and ADLs as indicated by Functional Deficits. [] Progressing: [x] Met: [] Not Met: [] Adjusted    Long Term Goals: To be achieved in: 12 weeks  1. Disability index score of 40% or more per LEFS to assist with reaching prior level of function. [x] Progressing: [] Met: [] Not Met: [] Adjusted  2. Patient will demonstrate increased AROM to 0-125 to allow for proper joint functioning as indicated by patients Functional Deficits. [x] Progressing: [] Met: [] Not Met: [] Adjusted  3. Patient will demonstrate an increase in Strength to good proximal hip strength and control, within 20% as tested by biodex in LE to allow for proper functional mobility as indicated by patients Functional Deficits. [x] Progressing: [] Met: [] Not Met: [] Adjusted  4. Patient will return to all ADL's and work related functional activities without increased symptoms or restriction. [x] Progressing: [] Met: [] Not Met: [] Adjusted    Overall Progression Towards Functional goals/ Treatment Progress Update:  [x] Patient is progressing as expected towards functional goals listed. [] Progression is slowed due to complexities/Impairments listed. [] Progression has been slowed due to co-morbidities.   [] Plan just implemented, too soon to assess goals progression <30days   [] Goals require adjustment due to lack of progress  [] Patient is not progressing as expected and requires additional follow up with physician  [] Other    Prognosis for POC: [x] Good [] Fair  [] Poor      Patient requires continued skilled intervention: [x] Yes  [] No    Treatment/Activity Tolerance:  [x] Patient able to complete treatment  [] Patient limited by fatigue  [] Patient limited by pain    []

## 2023-10-03 NOTE — TELEPHONE ENCOUNTER
CPT: [id number]  AUTH: [id number]  DATE RANGE: 10/16/23  INSURANCE: BCBS  LOCATION [facility]  AREA OF SX RT KNEE  NOTE: DOC SCANNED

## 2023-10-05 ENCOUNTER — HOSPITAL ENCOUNTER (OUTPATIENT)
Dept: PHYSICAL THERAPY | Age: 53
Setting detail: THERAPIES SERIES
Discharge: HOME | End: 2023-10-05
Payer: COMMERCIAL

## 2023-10-05 PROCEDURE — 97140 MANUAL THERAPY 1/> REGIONS: CPT

## 2023-10-05 PROCEDURE — 97112 NEUROMUSCULAR REEDUCATION: CPT

## 2023-10-05 PROCEDURE — 97110 THERAPEUTIC EXERCISES: CPT

## 2023-10-05 NOTE — FLOWSHEET NOTE
and eccentric single leg control. NMR and Therapeutic Activities:    [x] (41399 or 05682) Provided verbal/tactile cueing for activities related to improving balance, coordination, kinesthetic sense, posture, motor skill, proprioception and motor activation to allow for proper function of core, proximal hip and LE with self care and ADLs  [] (50515) Gait Re-education- Provided training and instruction to the patient for proper LE, core and proximal hip recruitment and positioning and eccentric body weight control with ambulation re-education including up and down stairs     Home Exercise Program:    [x] (12595) Reviewed/Progressed HEP activities related to strengthening, flexibility, endurance, ROM of core, proximal hip and LE for functional self-care, mobility, lifting and ambulation/stair navigation   [] (54322)Reviewed/Progressed HEP activities related to improving balance, coordination, kinesthetic sense, posture, motor skill, proprioception of core, proximal hip and LE for self care, mobility, lifting, and ambulation/stair navigation      Manual Treatments:  PROM / STM / Oscillations-Mobs:  G-I, II, III, IV (PA's, Inf., Post.)  [] (45843) Provided manual therapy to mobilize LE, proximal hip and/or LS spine soft tissue/joints for the purpose of modulating pain, promoting relaxation,  increasing ROM, reducing/eliminating soft tissue swelling/inflammation/restriction, improving soft tissue extensibility and allowing for proper ROM for normal function with self care, mobility, lifting and ambulation. Charges  Timed Code Treatment Minutes: 15   Total Treatment Minutes: 45     HIGH ESTIMATE PATIENT    [] MQ(61619) x      [] IONTO  [x] NMR (29095) x 1    [] VASO  [] Manual (30469) x      [] Other:  Gait training   [] TA x      [] Mech Traction (62490)  [] ES(attended) (44294)      [] ES (un) (06855):     GOALS:  Patient stated goal: To get full use of her knee back.   [x] Progressing: [] Met: [] Not Met: []

## 2023-10-09 ENCOUNTER — OFFICE VISIT (OUTPATIENT)
Dept: ORTHOPEDIC SURGERY | Age: 53
End: 2023-10-09
Payer: COMMERCIAL

## 2023-10-09 ENCOUNTER — HOSPITAL ENCOUNTER (OUTPATIENT)
Dept: PHYSICAL THERAPY | Age: 53
Setting detail: THERAPIES SERIES
Discharge: HOME | End: 2023-10-09
Payer: COMMERCIAL

## 2023-10-09 VITALS — BODY MASS INDEX: 19.62 KG/M2 | HEIGHT: 67 IN | WEIGHT: 125 LBS

## 2023-10-09 DIAGNOSIS — M23.51 OLD COMPLETE TEAR OF ANTERIOR CRUCIATE LIGAMENT OF RIGHT KNEE: Primary | ICD-10-CM

## 2023-10-09 PROCEDURE — 97112 NEUROMUSCULAR REEDUCATION: CPT

## 2023-10-09 PROCEDURE — 99215 OFFICE O/P EST HI 40 MIN: CPT | Performed by: ORTHOPAEDIC SURGERY

## 2023-10-09 NOTE — FLOWSHEET NOTE
single leg control. NMR and Therapeutic Activities:    [x] (41522 or 15210) Provided verbal/tactile cueing for activities related to improving balance, coordination, kinesthetic sense, posture, motor skill, proprioception and motor activation to allow for proper function of core, proximal hip and LE with self care and ADLs  [] (38093) Gait Re-education- Provided training and instruction to the patient for proper LE, core and proximal hip recruitment and positioning and eccentric body weight control with ambulation re-education including up and down stairs     Home Exercise Program:    [x] (59839) Reviewed/Progressed HEP activities related to strengthening, flexibility, endurance, ROM of core, proximal hip and LE for functional self-care, mobility, lifting and ambulation/stair navigation   [] (01098)Reviewed/Progressed HEP activities related to improving balance, coordination, kinesthetic sense, posture, motor skill, proprioception of core, proximal hip and LE for self care, mobility, lifting, and ambulation/stair navigation      Manual Treatments:  PROM / STM / Oscillations-Mobs:  G-I, II, III, IV (PA's, Inf., Post.)  [] (94331) Provided manual therapy to mobilize LE, proximal hip and/or LS spine soft tissue/joints for the purpose of modulating pain, promoting relaxation,  increasing ROM, reducing/eliminating soft tissue swelling/inflammation/restriction, improving soft tissue extensibility and allowing for proper ROM for normal function with self care, mobility, lifting and ambulation. Charges  Timed Code Treatment Minutes: 15   Total Treatment Minutes: 45     HIGH ESTIMATE PATIENT    [] RS(89606) x      [] IONTO  [x] NMR (60884) x 1    [] VASO  [] Manual (56026) x      [] Other:  Gait training   [] TA x      [] Mech Traction (63543)  [] ES(attended) (06379)      [] ES (un) (29597):     GOALS:  Patient stated goal: To get full use of her knee back.   [x] Progressing: [] Met: [] Not Met: []

## 2023-10-12 ENCOUNTER — TELEPHONE (OUTPATIENT)
Dept: ORTHOPEDIC SURGERY | Age: 53
End: 2023-10-12

## 2023-10-12 ENCOUNTER — HOSPITAL ENCOUNTER (OUTPATIENT)
Dept: PHYSICAL THERAPY | Age: 53
Setting detail: THERAPIES SERIES
Discharge: HOME | End: 2023-10-12
Payer: COMMERCIAL

## 2023-10-12 PROCEDURE — 97112 NEUROMUSCULAR REEDUCATION: CPT

## 2023-10-12 NOTE — THERAPY RECERTIFICATION
eccentric single leg control. NMR and Therapeutic Activities:    [x] (98035 or 01800) Provided verbal/tactile cueing for activities related to improving balance, coordination, kinesthetic sense, posture, motor skill, proprioception and motor activation to allow for proper function of core, proximal hip and LE with self care and ADLs  [] (32345) Gait Re-education- Provided training and instruction to the patient for proper LE, core and proximal hip recruitment and positioning and eccentric body weight control with ambulation re-education including up and down stairs     Home Exercise Program:    [x] (05330) Reviewed/Progressed HEP activities related to strengthening, flexibility, endurance, ROM of core, proximal hip and LE for functional self-care, mobility, lifting and ambulation/stair navigation   [] (36032)Reviewed/Progressed HEP activities related to improving balance, coordination, kinesthetic sense, posture, motor skill, proprioception of core, proximal hip and LE for self care, mobility, lifting, and ambulation/stair navigation      Manual Treatments:  PROM / STM / Oscillations-Mobs:  G-I, II, III, IV (PA's, Inf., Post.)  [] (64679) Provided manual therapy to mobilize LE, proximal hip and/or LS spine soft tissue/joints for the purpose of modulating pain, promoting relaxation,  increasing ROM, reducing/eliminating soft tissue swelling/inflammation/restriction, improving soft tissue extensibility and allowing for proper ROM for normal function with self care, mobility, lifting and ambulation. Charges  Timed Code Treatment Minutes: 15   Total Treatment Minutes: 45     HIGH ESTIMATE PATIENT    [] TRELL(27944) x      [] IONTO  [x] NMR (69958) x 1    [] VASO  [] Manual (50025) x      [] Other:  Gait training   [] TA x      [] Mech Traction (04269)  [] ES(attended) (49353)      [] ES (un) (72713):     GOALS:  Patient stated goal: To get full use of her knee back.   [x] Progressing: [] Met: [] Not Met: []

## 2023-10-16 ENCOUNTER — HOSPITAL ENCOUNTER (OUTPATIENT)
Dept: PHYSICAL THERAPY | Age: 53
Setting detail: THERAPIES SERIES
Discharge: HOME | End: 2023-10-16

## 2023-10-16 PROCEDURE — 97112 NEUROMUSCULAR REEDUCATION: CPT

## 2023-10-16 NOTE — THERAPY RECERTIFICATION
[] Not Met: [] Adjusted    Therapist goals for Patient:   Short Term Goals: To be achieved in: 2 weeks  1. Independent in HEP and progression per patient tolerance, in order to prevent re-injury. [] Progressing: [x] Met: [] Not Met: [] Adjusted  2. Patient will have a decrease in pain to facilitate improvement in movement, function, and ADLs as indicated by Functional Deficits. [] Progressing: [x] Met: [] Not Met: [] Adjusted    Long Term Goals: To be achieved in: 12 weeks  1. Disability index score of 40% or more per LEFS to assist with reaching prior level of function. [x] Progressing: [] Met: [] Not Met: [] Adjusted  2. Patient will demonstrate increased AROM to 0-125 to allow for proper joint functioning as indicated by patients Functional Deficits. [x] Progressing: [] Met: [] Not Met: [] Adjusted  3. Patient will demonstrate an increase in Strength to good proximal hip strength and control, within 20% as tested by biodex in LE to allow for proper functional mobility as indicated by patients Functional Deficits. [x] Progressing: [] Met: [] Not Met: [] Adjusted  4. Patient will return to all ADL's and work related functional activities without increased symptoms or restriction. [x] Progressing: [] Met: [] Not Met: [] Adjusted    Overall Progression Towards Functional goals/ Treatment Progress Update:  [x] Patient is progressing as expected towards functional goals listed. [] Progression is slowed due to complexities/Impairments listed. [] Progression has been slowed due to co-morbidities.   [] Plan just implemented, too soon to assess goals progression <30days   [] Goals require adjustment due to lack of progress  [] Patient is not progressing as expected and requires additional follow up with physician  [] Other    Prognosis for POC: [x] Good [] Fair  [] Poor      Patient requires continued skilled intervention: [x] Yes  [] No    Treatment/Activity Tolerance:  [x] Patient able to complete treatment

## 2023-10-18 ENCOUNTER — TELEPHONE (OUTPATIENT)
Dept: ORTHOPEDIC SURGERY | Age: 53
End: 2023-10-18

## 2023-10-18 NOTE — TELEPHONE ENCOUNTER
Patient called this morning, she will like a call back to schedule for surgery with Dr. Dnaiel Rodriguez. The available dates she has open are:  October 20th, October 27 or November 17. Please advise.      Phone: [phone]      Thanks

## 2023-10-18 NOTE — TELEPHONE ENCOUNTER
Returned pt's call.  LM to return call and ask to speak to [facility]

## 2023-10-18 NOTE — TELEPHONE ENCOUNTER
Spoke with pt. Sx scheduled for 10/27 as requested. PT was offered 10/20 but chose to proceed with 10/27 to prepare prior to sx.

## 2023-10-18 NOTE — TELEPHONE ENCOUNTER
CALLED FOR PRIOR AUTH WITH LEON FEDERAL    CASE [id number]    Faxed clinicals as requested to [phone]

## 2023-10-19 ENCOUNTER — HOSPITAL ENCOUNTER (OUTPATIENT)
Dept: PHYSICAL THERAPY | Age: 53
Setting detail: THERAPIES SERIES
Discharge: HOME | End: 2023-10-19

## 2023-10-19 PROCEDURE — 97112 NEUROMUSCULAR REEDUCATION: CPT

## 2023-10-23 ENCOUNTER — APPOINTMENT (OUTPATIENT)
Dept: PHYSICAL THERAPY | Age: 53
End: 2023-10-23
Payer: COMMERCIAL

## 2023-10-23 ENCOUNTER — TELEPHONE (OUTPATIENT)
Dept: ORTHOPEDIC SURGERY | Age: 53
End: 2023-10-23

## 2023-10-23 DIAGNOSIS — S83.281A ACUTE LATERAL MENISCUS TEAR OF RIGHT KNEE, INITIAL ENCOUNTER: Primary | ICD-10-CM

## 2023-10-23 DIAGNOSIS — S83.511D RUPTURE OF ANTERIOR CRUCIATE LIGAMENT OF RIGHT KNEE, SUBSEQUENT ENCOUNTER: ICD-10-CM

## 2023-10-23 DIAGNOSIS — M25.561 RIGHT KNEE PAIN, UNSPECIFIED CHRONICITY: ICD-10-CM

## 2023-10-23 NOTE — TELEPHONE ENCOUNTER
STAT CT SCAN ORDERED. LM for pt to call to schedule CT SCAN prior to sx. She can call central scheduling at [phone] to schedule. Patient does not need to follow up after CT scan. It is for pre-operative planning ONLY.

## 2023-10-24 ENCOUNTER — TELEPHONE (OUTPATIENT)
Dept: ORTHOPEDIC SURGERY | Age: 53
End: 2023-10-24

## 2023-10-24 ENCOUNTER — HOSPITAL ENCOUNTER (OUTPATIENT)
Dept: CT IMAGING | Age: 53
Discharge: HOME | End: 2023-10-24
Attending: ORTHOPAEDIC SURGERY
Payer: COMMERCIAL

## 2023-10-24 DIAGNOSIS — M25.561 RIGHT KNEE PAIN, UNSPECIFIED CHRONICITY: ICD-10-CM

## 2023-10-24 DIAGNOSIS — S83.511D RUPTURE OF ANTERIOR CRUCIATE LIGAMENT OF RIGHT KNEE, SUBSEQUENT ENCOUNTER: ICD-10-CM

## 2023-10-24 DIAGNOSIS — S83.281A ACUTE LATERAL MENISCUS TEAR OF RIGHT KNEE, INITIAL ENCOUNTER: ICD-10-CM

## 2023-10-24 PROCEDURE — 73700 CT LOWER EXTREMITY W/O DYE: CPT

## 2023-10-25 ENCOUNTER — TELEPHONE (OUTPATIENT)
Dept: ORTHOPEDIC SURGERY | Age: 53
End: 2023-10-25

## 2023-10-25 DIAGNOSIS — G89.18 POST-OPERATIVE PAIN: Primary | ICD-10-CM

## 2023-10-25 NOTE — TELEPHONE ENCOUNTER
Spoke with pt. Letter written in regards to sx date.      PT aware of sx time      SX- 12pm  Arrival-10am

## 2023-10-25 NOTE — PROGRESS NOTES
guardian must accompany a child scheduled for surgery and plan to stay at the hospital until the child is discharged. Please do not bring other children with you. 9. Please wear simple, loose fitting clothing to the hospital.  Dania Peers not bring valuables (money, credit cards, checkbooks, etc.) Do not wear any makeup (including no eye makeup) or nail polish on your fingers or toes. 10. DO NOT wear any jewelry or piercings on day of surgery. All body piercing jewelry must be removed. 11. If you have ___dentures, they will be removed before going to the OR; we will provide you a container. If you wear ___contact lenses or __x_glasses, they will be removed; please bring a case for them. 12. Please see your family doctor/pediatrician for a history & physical and/or concerning medications. Bring any test results/reports from your physician's office. PCP__________________Phone___________H&P Appt. Date________             13 If you  have a Living Will and Durable Power of  for Healthcare, please bring in a copy. 15. Notify your Surgeon if you develop any illness between now and surgery  time, cough, cold, fever, sore throat, nausea, vomiting, etc.  Please notify your surgeon if you experience dizziness, shortness of breath or blurred vision between now & the time of your surgery             15. DO NOT shave your operative site 96 hours prior to surgery. For face & neck surgery, men may use an electric razor 48 hours prior to surgery. 16. Shower the night before or morning of surgery using an antibacterial soap or as you have been instructed. 17. To provide excellent care visitors will be limited to one in the room at any given time. 18.  Please bring picture ID and insurance card. 19.  Visit our web site for additional information:  [email or web address]              20.During flu season no children under the age of 14 are permitted in the hospital for the safety of all patients. 21. If you take a long acting insulin in the evening only  take half of your usual  dose the night  before your procedure              22. If you use a c-pap please bring DOS if staying overnight,             23.For your convenience [address] [place] [address] has a pharmacy on site to fill your prescriptions. 24. If you use oxygen and have a portable tank please bring it  with you the DOS             25. Bring a complete list of all your medications with name and dose include any supplements. 26. Other__________________________________________   *Please call pre admission testing if you any further questions   [facility] [facility]  [phone]   [address] [place] [address]       VISITOR POLICY(subject to change)    Current policy is 2 visitors per patient. No children. Mask is  at the discretion of the facility. Visiting hours are 8a-8p. Overnight visitors will be at the discretion of the nurse. All policies subject to change. All above information reviewed with patient in person or by phone. Patient verbalizes understanding. All questions and concerns addressed.                                                                                                  Patient/Rep__patient__________________                                                                                                                                    PRE OP INSTRUCTIONS

## 2023-10-26 ENCOUNTER — TELEPHONE (OUTPATIENT)
Dept: ORTHOPEDIC SURGERY | Age: 53
End: 2023-10-26

## 2023-10-26 ENCOUNTER — APPOINTMENT (OUTPATIENT)
Dept: PHYSICAL THERAPY | Age: 53
End: 2023-10-26
Payer: COMMERCIAL

## 2023-10-26 RX ORDER — ONDANSETRON 4 MG/1
4 TABLET, FILM COATED ORAL EVERY 8 HOURS PRN
Qty: 21 TABLET | Refills: 0 | Status: SHIPPED | OUTPATIENT
Start: 2023-10-26 | End: 2023-11-02

## 2023-10-26 RX ORDER — HYDROCODONE BITARTRATE AND ACETAMINOPHEN 10; 325 MG/1; MG/1
1 TABLET ORAL EVERY 4 HOURS PRN
Qty: 30 TABLET | Refills: 0 | Status: SHIPPED | OUTPATIENT
Start: 2023-10-26 | End: 2023-11-02

## 2023-10-26 RX ORDER — ASPIRIN 325 MG
325 TABLET ORAL DAILY
Qty: 14 TABLET | Refills: 0 | Status: SHIPPED | OUTPATIENT
Start: 2023-10-26 | End: 2023-11-09

## 2023-10-26 NOTE — DISCHARGE INSTRUCTIONS
Orthopedic Surgery Discharge Instructions    Medications:   A pain medication has been prescribed for you. Please take this as instructed by the pharmacist.  An anti-nausea medication has been prescribed for you to use as needed for nausea. Either aspirin or a blood thinner medication may have been prescribed for you. Please take this as instructed. Activity:  *** weightbearing with crutches  ***. Must remain in knee brace locked in extension at all times while ambulating. When not ambulating may unlock the brace to 90 degrees. You should sleep in the brace for the first 2 weeks. 3 times a day you should unlock the brace and dangle from the side of a bed or chair so that your knee is bent to 90 degrees. Incision/dressings: You may remove your dressing in 72 hours. Until then the dressing needs to remain clean and dry. Following removal dressing please apply dry dressing daily. You may shower in 72 hours and allow the water to run over the incision. However no submerging underwater or getting in pools. Follow-up: If you do not already have a postoperative follow-up appointment scheduled you should call to schedule an appointment within 10 to 14 days of surgery. Emergency or after hours questions:  Please call the main call center at [phone] for all questions or concerns during evening and weekend hours. The call center will direct your call to the on-call physician to answer your questions and concerns. During weekly office hours you may call my assistant, Mer, at [phone]. Cassandra Paez MD            Orthopaedic Surgery Sports Medicine and [facility] and [facility]            Team Physician [facility])      [address] [place] [address] your seatbelt home.   You are under the influence

## 2023-10-27 ENCOUNTER — ANESTHESIA EVENT (OUTPATIENT)
Dept: OPERATING ROOM | Age: 53
End: 2023-10-27
Payer: COMMERCIAL

## 2023-10-27 ENCOUNTER — ANESTHESIA (OUTPATIENT)
Dept: OPERATING ROOM | Age: 53
End: 2023-10-27
Payer: COMMERCIAL

## 2023-10-27 ENCOUNTER — HOSPITAL ENCOUNTER (OUTPATIENT)
Age: 53
Setting detail: OUTPATIENT SURGERY
Discharge: HOME | End: 2023-10-27
Attending: ORTHOPAEDIC SURGERY | Admitting: ORTHOPAEDIC SURGERY
Payer: COMMERCIAL

## 2023-10-27 VITALS
DIASTOLIC BLOOD PRESSURE: 65 MMHG | TEMPERATURE: 97.4 F | SYSTOLIC BLOOD PRESSURE: 103 MMHG | HEART RATE: 95 BPM | WEIGHT: 126 LBS | BODY MASS INDEX: 19.78 KG/M2 | RESPIRATION RATE: 20 BRPM | HEIGHT: 67 IN | OXYGEN SATURATION: 99 %

## 2023-10-27 PROCEDURE — 7100000011 HC PHASE II RECOVERY - ADDTL 15 MIN: Performed by: ORTHOPAEDIC SURGERY

## 2023-10-27 PROCEDURE — 2709999900 HC NON-CHARGEABLE SUPPLY: Performed by: ORTHOPAEDIC SURGERY

## 2023-10-27 PROCEDURE — 7100000010 HC PHASE II RECOVERY - FIRST 15 MIN: Performed by: ORTHOPAEDIC SURGERY

## 2023-10-27 PROCEDURE — 6360000002 HC RX W HCPCS: Performed by: ORTHOPAEDIC SURGERY

## 2023-10-27 PROCEDURE — 3700000000 HC ANESTHESIA ATTENDED CARE: Performed by: ORTHOPAEDIC SURGERY

## 2023-10-27 PROCEDURE — 2500000003 HC RX 250 WO HCPCS: Performed by: ORTHOPAEDIC SURGERY

## 2023-10-27 PROCEDURE — C1776 JOINT DEVICE (IMPLANTABLE): HCPCS | Performed by: ORTHOPAEDIC SURGERY

## 2023-10-27 PROCEDURE — 3600000014 HC SURGERY LEVEL 4 ADDTL 15MIN: Performed by: ORTHOPAEDIC SURGERY

## 2023-10-27 PROCEDURE — 2580000003 HC RX 258: Performed by: ORTHOPAEDIC SURGERY

## 2023-10-27 PROCEDURE — C1713 ANCHOR/SCREW BN/BN,TIS/BN: HCPCS | Performed by: ORTHOPAEDIC SURGERY

## 2023-10-27 PROCEDURE — 2500000003 HC RX 250 WO HCPCS: Performed by: NURSE ANESTHETIST, CERTIFIED REGISTERED

## 2023-10-27 PROCEDURE — 3700000001 HC ADD 15 MINUTES (ANESTHESIA): Performed by: ORTHOPAEDIC SURGERY

## 2023-10-27 PROCEDURE — 6370000000 HC RX 637 (ALT 250 FOR IP): Performed by: ANESTHESIOLOGY

## 2023-10-27 PROCEDURE — 2720000010 HC SURG SUPPLY STERILE: Performed by: ORTHOPAEDIC SURGERY

## 2023-10-27 PROCEDURE — 7100000001 HC PACU RECOVERY - ADDTL 15 MIN: Performed by: ORTHOPAEDIC SURGERY

## 2023-10-27 PROCEDURE — 3600000004 HC SURGERY LEVEL 4 BASE: Performed by: ORTHOPAEDIC SURGERY

## 2023-10-27 PROCEDURE — 6360000002 HC RX W HCPCS: Performed by: ANESTHESIOLOGY

## 2023-10-27 PROCEDURE — 6360000002 HC RX W HCPCS: Performed by: NURSE ANESTHETIST, CERTIFIED REGISTERED

## 2023-10-27 PROCEDURE — 6370000000 HC RX 637 (ALT 250 FOR IP): Performed by: NURSE ANESTHETIST, CERTIFIED REGISTERED

## 2023-10-27 PROCEDURE — 2580000003 HC RX 258: Performed by: ANESTHESIOLOGY

## 2023-10-27 PROCEDURE — 6360000002 HC RX W HCPCS

## 2023-10-27 PROCEDURE — 7100000000 HC PACU RECOVERY - FIRST 15 MIN: Performed by: ORTHOPAEDIC SURGERY

## 2023-10-27 DEVICE — 2.8MM Q-FIX ALL SUTURE ANCHOR
Type: IMPLANTABLE DEVICE | Site: KNEE | Status: FUNCTIONAL
Brand: Q-FIX

## 2023-10-27 DEVICE — IMPLANTABLE DEVICE: Type: IMPLANTABLE DEVICE | Site: KNEE | Status: FUNCTIONAL

## 2023-10-27 DEVICE — SCREW INTFR L23MM DIA7MM BIOCRYL RAPIDE ABSRB MILAGRO ADV: Type: IMPLANTABLE DEVICE | Site: KNEE | Status: FUNCTIONAL

## 2023-10-27 RX ORDER — SUCCINYLCHOLINE/SOD CL,ISO/PF 200MG/10ML
SYRINGE (ML) INTRAVENOUS PRN
Status: DISCONTINUED | OUTPATIENT
Start: 2023-10-27 | End: 2023-10-27 | Stop reason: SDUPTHER

## 2023-10-27 RX ORDER — SODIUM CHLORIDE 9 MG/ML
INJECTION, SOLUTION INTRAVENOUS PRN
Status: DISCONTINUED | OUTPATIENT
Start: 2023-10-27 | End: 2023-10-27 | Stop reason: HOSPADM

## 2023-10-27 RX ORDER — ONDANSETRON 2 MG/ML
INJECTION INTRAMUSCULAR; INTRAVENOUS PRN
Status: DISCONTINUED | OUTPATIENT
Start: 2023-10-27 | End: 2023-10-27 | Stop reason: SDUPTHER

## 2023-10-27 RX ORDER — KETOROLAC TROMETHAMINE 30 MG/ML
INJECTION, SOLUTION INTRAMUSCULAR; INTRAVENOUS PRN
Status: DISCONTINUED | OUTPATIENT
Start: 2023-10-27 | End: 2023-10-27 | Stop reason: SDUPTHER

## 2023-10-27 RX ORDER — SODIUM CHLORIDE 0.9 % (FLUSH) 0.9 %
5-40 SYRINGE (ML) INJECTION PRN
Status: DISCONTINUED | OUTPATIENT
Start: 2023-10-27 | End: 2023-10-27 | Stop reason: HOSPADM

## 2023-10-27 RX ORDER — SCOLOPAMINE TRANSDERMAL SYSTEM 1 MG/1
1 PATCH, EXTENDED RELEASE TRANSDERMAL
Status: DISCONTINUED | OUTPATIENT
Start: 2023-10-27 | End: 2023-10-27 | Stop reason: HOSPADM

## 2023-10-27 RX ORDER — HYDROMORPHONE HYDROCHLORIDE 2 MG/ML
INJECTION, SOLUTION INTRAMUSCULAR; INTRAVENOUS; SUBCUTANEOUS PRN
Status: DISCONTINUED | OUTPATIENT
Start: 2023-10-27 | End: 2023-10-27 | Stop reason: SDUPTHER

## 2023-10-27 RX ORDER — FENTANYL CITRATE 50 UG/ML
50 INJECTION, SOLUTION INTRAMUSCULAR; INTRAVENOUS EVERY 5 MIN PRN
Status: DISCONTINUED | OUTPATIENT
Start: 2023-10-27 | End: 2023-10-27 | Stop reason: HOSPADM

## 2023-10-27 RX ORDER — DEXAMETHASONE SODIUM PHOSPHATE 4 MG/ML
INJECTION, SOLUTION INTRA-ARTICULAR; INTRALESIONAL; INTRAMUSCULAR; INTRAVENOUS; SOFT TISSUE PRN
Status: DISCONTINUED | OUTPATIENT
Start: 2023-10-27 | End: 2023-10-27 | Stop reason: SDUPTHER

## 2023-10-27 RX ORDER — LIDOCAINE HYDROCHLORIDE 20 MG/ML
INJECTION, SOLUTION EPIDURAL; INFILTRATION; INTRACAUDAL; PERINEURAL PRN
Status: DISCONTINUED | OUTPATIENT
Start: 2023-10-27 | End: 2023-10-27 | Stop reason: SDUPTHER

## 2023-10-27 RX ORDER — HYDROMORPHONE HYDROCHLORIDE 2 MG/ML
0.5 INJECTION, SOLUTION INTRAMUSCULAR; INTRAVENOUS; SUBCUTANEOUS EVERY 5 MIN PRN
Status: DISCONTINUED | OUTPATIENT
Start: 2023-10-27 | End: 2023-10-27 | Stop reason: HOSPADM

## 2023-10-27 RX ORDER — DEXMEDETOMIDINE HYDROCHLORIDE 100 UG/ML
INJECTION, SOLUTION INTRAVENOUS PRN
Status: DISCONTINUED | OUTPATIENT
Start: 2023-10-27 | End: 2023-10-27 | Stop reason: SDUPTHER

## 2023-10-27 RX ORDER — ONDANSETRON 2 MG/ML
INJECTION INTRAMUSCULAR; INTRAVENOUS
Status: COMPLETED
Start: 2023-10-27 | End: 2023-10-27

## 2023-10-27 RX ORDER — ROCURONIUM BROMIDE 10 MG/ML
INJECTION, SOLUTION INTRAVENOUS PRN
Status: DISCONTINUED | OUTPATIENT
Start: 2023-10-27 | End: 2023-10-27 | Stop reason: SDUPTHER

## 2023-10-27 RX ORDER — SCOLOPAMINE TRANSDERMAL SYSTEM 1 MG/1
PATCH, EXTENDED RELEASE TRANSDERMAL PRN
Status: DISCONTINUED | OUTPATIENT
Start: 2023-10-27 | End: 2023-10-27 | Stop reason: SDUPTHER

## 2023-10-27 RX ORDER — SODIUM CHLORIDE 0.9 % (FLUSH) 0.9 %
5-40 SYRINGE (ML) INJECTION EVERY 12 HOURS SCHEDULED
Status: DISCONTINUED | OUTPATIENT
Start: 2023-10-27 | End: 2023-10-27 | Stop reason: HOSPADM

## 2023-10-27 RX ORDER — TRANEXAMIC ACID 10 MG/ML
1000 INJECTION, SOLUTION INTRAVENOUS ONCE
Status: COMPLETED | OUTPATIENT
Start: 2023-10-27 | End: 2023-10-27

## 2023-10-27 RX ORDER — TRANEXAMIC ACID 10 MG/ML
1000 INJECTION, SOLUTION INTRAVENOUS
Status: COMPLETED | OUTPATIENT
Start: 2023-10-27 | End: 2023-10-27

## 2023-10-27 RX ORDER — OXYCODONE HYDROCHLORIDE 5 MG/1
10 TABLET ORAL PRN
Status: DISCONTINUED | OUTPATIENT
Start: 2023-10-27 | End: 2023-10-27 | Stop reason: HOSPADM

## 2023-10-27 RX ORDER — PROPOFOL 10 MG/ML
INJECTION, EMULSION INTRAVENOUS PRN
Status: DISCONTINUED | OUTPATIENT
Start: 2023-10-27 | End: 2023-10-27 | Stop reason: SDUPTHER

## 2023-10-27 RX ORDER — OXYCODONE HYDROCHLORIDE 5 MG/1
5 TABLET ORAL PRN
Status: DISCONTINUED | OUTPATIENT
Start: 2023-10-27 | End: 2023-10-27 | Stop reason: HOSPADM

## 2023-10-27 RX ORDER — ONDANSETRON 2 MG/ML
4 INJECTION INTRAMUSCULAR; INTRAVENOUS
Status: COMPLETED | OUTPATIENT
Start: 2023-10-27 | End: 2023-10-27

## 2023-10-27 RX ORDER — MAGNESIUM SULFATE HEPTAHYDRATE 500 MG/ML
INJECTION, SOLUTION INTRAMUSCULAR; INTRAVENOUS PRN
Status: DISCONTINUED | OUTPATIENT
Start: 2023-10-27 | End: 2023-10-27 | Stop reason: SDUPTHER

## 2023-10-27 RX ORDER — CEFAZOLIN SODIUM 1 G/3ML
INJECTION, POWDER, FOR SOLUTION INTRAMUSCULAR; INTRAVENOUS PRN
Status: DISCONTINUED | OUTPATIENT
Start: 2023-10-27 | End: 2023-10-27 | Stop reason: SDUPTHER

## 2023-10-27 RX ORDER — MIDAZOLAM HYDROCHLORIDE 1 MG/ML
INJECTION INTRAMUSCULAR; INTRAVENOUS PRN
Status: DISCONTINUED | OUTPATIENT
Start: 2023-10-27 | End: 2023-10-27 | Stop reason: SDUPTHER

## 2023-10-27 RX ORDER — SODIUM CHLORIDE, SODIUM LACTATE, POTASSIUM CHLORIDE, CALCIUM CHLORIDE 600; 310; 30; 20 MG/100ML; MG/100ML; MG/100ML; MG/100ML
INJECTION, SOLUTION INTRAVENOUS CONTINUOUS
Status: DISCONTINUED | OUTPATIENT
Start: 2023-10-27 | End: 2023-10-27 | Stop reason: HOSPADM

## 2023-10-27 RX ORDER — LIDOCAINE HYDROCHLORIDE 10 MG/ML
0.5 INJECTION, SOLUTION EPIDURAL; INFILTRATION; INTRACAUDAL; PERINEURAL ONCE
Status: DISCONTINUED | OUTPATIENT
Start: 2023-10-27 | End: 2023-10-27 | Stop reason: HOSPADM

## 2023-10-27 RX ORDER — MEPERIDINE HYDROCHLORIDE 25 MG/ML
12.5 INJECTION INTRAMUSCULAR; INTRAVENOUS; SUBCUTANEOUS EVERY 5 MIN PRN
Status: DISCONTINUED | OUTPATIENT
Start: 2023-10-27 | End: 2023-10-27 | Stop reason: HOSPADM

## 2023-10-27 RX ORDER — FENTANYL CITRATE 50 UG/ML
INJECTION, SOLUTION INTRAMUSCULAR; INTRAVENOUS PRN
Status: DISCONTINUED | OUTPATIENT
Start: 2023-10-27 | End: 2023-10-27 | Stop reason: SDUPTHER

## 2023-10-27 RX ORDER — ACETAMINOPHEN 500 MG
1000 TABLET ORAL ONCE
Status: COMPLETED | OUTPATIENT
Start: 2023-10-27 | End: 2023-10-27

## 2023-10-27 RX ADMIN — SUGAMMADEX 200 MG: 100 INJECTION, SOLUTION INTRAVENOUS at 12:40

## 2023-10-27 RX ADMIN — LIDOCAINE HYDROCHLORIDE 50 MG: 20 INJECTION, SOLUTION EPIDURAL; INFILTRATION; INTRACAUDAL; PERINEURAL at 08:48

## 2023-10-27 RX ADMIN — DEXMEDETOMIDINE HYDROCHLORIDE 10 MCG: 100 INJECTION, SOLUTION INTRAVENOUS at 09:05

## 2023-10-27 RX ADMIN — ROCURONIUM BROMIDE 20 MG: 10 INJECTION, SOLUTION INTRAVENOUS at 09:09

## 2023-10-27 RX ADMIN — ONDANSETRON 4 MG: 2 INJECTION INTRAMUSCULAR; INTRAVENOUS at 12:15

## 2023-10-27 RX ADMIN — ROCURONIUM BROMIDE 20 MG: 10 INJECTION, SOLUTION INTRAVENOUS at 10:49

## 2023-10-27 RX ADMIN — ROCURONIUM BROMIDE 30 MG: 10 INJECTION, SOLUTION INTRAVENOUS at 09:42

## 2023-10-27 RX ADMIN — ONDANSETRON 4 MG: 2 INJECTION INTRAMUSCULAR; INTRAVENOUS at 13:05

## 2023-10-27 RX ADMIN — SODIUM CHLORIDE, POTASSIUM CHLORIDE, SODIUM LACTATE AND CALCIUM CHLORIDE: 600; 310; 30; 20 INJECTION, SOLUTION INTRAVENOUS at 07:46

## 2023-10-27 RX ADMIN — HYDROMORPHONE HYDROCHLORIDE 0.5 MG: 2 INJECTION, SOLUTION INTRAMUSCULAR; INTRAVENOUS; SUBCUTANEOUS at 11:17

## 2023-10-27 RX ADMIN — SCOPALAMINE 1 PATCH: 1 PATCH, EXTENDED RELEASE TRANSDERMAL at 08:41

## 2023-10-27 RX ADMIN — PROMETHAZINE HYDROCHLORIDE 6.25 MG: 25 INJECTION INTRAMUSCULAR; INTRAVENOUS at 13:35

## 2023-10-27 RX ADMIN — SODIUM CHLORIDE, POTASSIUM CHLORIDE, SODIUM LACTATE AND CALCIUM CHLORIDE: 600; 310; 30; 20 INJECTION, SOLUTION INTRAVENOUS at 07:44

## 2023-10-27 RX ADMIN — KETOROLAC TROMETHAMINE 30 MG: 30 INJECTION, SOLUTION INTRAMUSCULAR; INTRAVENOUS at 12:15

## 2023-10-27 RX ADMIN — FENTANYL CITRATE 50 MCG: 50 INJECTION, SOLUTION INTRAMUSCULAR; INTRAVENOUS at 08:48

## 2023-10-27 RX ADMIN — TRANEXAMIC ACID 1000 MG: 10 INJECTION, SOLUTION INTRAVENOUS at 08:52

## 2023-10-27 RX ADMIN — DEXMEDETOMIDINE HYDROCHLORIDE 5 MCG: 100 INJECTION, SOLUTION INTRAVENOUS at 10:49

## 2023-10-27 RX ADMIN — PROPOFOL 120 MG: 10 INJECTION, EMULSION INTRAVENOUS at 08:48

## 2023-10-27 RX ADMIN — FENTANYL CITRATE 50 MCG: 50 INJECTION, SOLUTION INTRAMUSCULAR; INTRAVENOUS at 09:42

## 2023-10-27 RX ADMIN — ACETAMINOPHEN 1000 MG: 500 TABLET ORAL at 07:34

## 2023-10-27 RX ADMIN — TRANEXAMIC ACID 1000 MG: 10 INJECTION, SOLUTION INTRAVENOUS at 11:36

## 2023-10-27 RX ADMIN — MIDAZOLAM 2 MG: 1 INJECTION INTRAMUSCULAR; INTRAVENOUS at 08:43

## 2023-10-27 RX ADMIN — ROCURONIUM BROMIDE 10 MG: 10 INJECTION, SOLUTION INTRAVENOUS at 10:20

## 2023-10-27 RX ADMIN — MAGNESIUM SULFATE HEPTAHYDRATE 1 G: 500 INJECTION, SOLUTION INTRAMUSCULAR; INTRAVENOUS at 08:57

## 2023-10-27 RX ADMIN — CEFAZOLIN 2 G: 1 INJECTION, POWDER, FOR SOLUTION INTRAVENOUS at 11:46

## 2023-10-27 RX ADMIN — Medication 100 MG: at 08:49

## 2023-10-27 RX ADMIN — DEXAMETHASONE SODIUM PHOSPHATE 8 MG: 4 INJECTION, SOLUTION INTRAMUSCULAR; INTRAVENOUS at 08:59

## 2023-10-27 RX ADMIN — CEFAZOLIN 2000 MG: 2 INJECTION, POWDER, FOR SOLUTION INTRAMUSCULAR; INTRAVENOUS at 07:46

## 2023-10-27 RX ADMIN — HYDROMORPHONE HYDROCHLORIDE 0.5 MG: 2 INJECTION, SOLUTION INTRAMUSCULAR; INTRAVENOUS; SUBCUTANEOUS at 12:26

## 2023-10-27 RX ADMIN — DEXMEDETOMIDINE HYDROCHLORIDE 5 MCG: 100 INJECTION, SOLUTION INTRAVENOUS at 09:43

## 2023-10-27 RX ADMIN — ROCURONIUM BROMIDE 10 MG: 10 INJECTION, SOLUTION INTRAVENOUS at 11:22

## 2023-10-27 RX ADMIN — SODIUM CHLORIDE, POTASSIUM CHLORIDE, SODIUM LACTATE AND CALCIUM CHLORIDE: 600; 310; 30; 20 INJECTION, SOLUTION INTRAVENOUS at 10:35

## 2023-10-27 ASSESSMENT — PAIN SCALES - GENERAL: PAINLEVEL_OUTOF10: 0

## 2023-10-27 ASSESSMENT — LIFESTYLE VARIABLES: SMOKING_STATUS: 0

## 2023-10-27 ASSESSMENT — PAIN - FUNCTIONAL ASSESSMENT: PAIN_FUNCTIONAL_ASSESSMENT: 0-10

## 2023-10-27 NOTE — PROGRESS NOTES
Pt arrived from OR to PACU bay 6. Reported received from [address] staff. Pt arousable to voice. Surgical incisions dressings in place to right knee. Pt on 4LNC. NST, and VSS. Will continue to monitor.

## 2023-10-27 NOTE — OP NOTE
proceeded with 2-0 Vicryl for subcutaneous layer followed by 4-0 Monocryl for the skin. Sterile dressings were applied in the form of Xeroform, 4 x 4's, ABD, and Ace. Of note, since this was a revision surgery to an additional 50 to 100% of operative time and was significantly more technically demanding than a standard ACL reconstruction.        Electronically signed by Orion Meigs, MD on 10/27/2023 at 12:39 PM

## 2023-10-27 NOTE — PROGRESS NOTES
Pt awake and alert at this time. Pt on RA, and VSS. Pt denies pain-nausea being managed-see MAR, tolerating PO. Skin warm , palpable pulses and able to wiggle right toes. Pt meets criteria to be discharged from Phase 1.

## 2023-10-27 NOTE — ANESTHESIA POSTPROCEDURE EVALUATION
Department of Anesthesiology  Postprocedure Note    Patient: Bessie Estrada  MRN: [id number]  YOB: 1970  Date of evaluation: 10/27/2023      Procedure Summary     Date: 10/27/23 Room / Location: [address]    Anesthesia Start: 9374 Anesthesia Stop: 5827    Procedure: RIGHT KNEE PIVOT SHIFT UNDER ANESTHESIA; RIGHT KNEE ARTHROSCOPY; ANTERIOR CRUCIATE LIGAMENT RECONSTRUCTION WITH BONE TO BONE AUTOGRAFT; LATERAL EXTRA ARTICULAR TENODESIS;-DEPUY MITEK (Right: Knee) Diagnosis:       New tear of anterior cruciate ligament of right knee, initial encounter      Tear of lateral meniscus of right knee, current, unspecified tear type, initial encounter      (New tear of anterior cruciate ligament of right knee, initial encounter [S83.511A])      (Tear of lateral meniscus of right knee, current, unspecified tear type, initial encounter [Z59.925I])    Surgeons: Yang Zimmerman MD Responsible Provider: Nam Keenan MD    Anesthesia Type: general ASA Status: 1          Anesthesia Type: No value filed.     Darlyn Phase I: Darlyn Score: 8    Darlyn Phase II:        Anesthesia Post Evaluation    Patient location during evaluation: PACU  Patient participation: complete - patient participated  Level of consciousness: awake and alert  Pain score: 2  Airway patency: patent  Nausea & Vomiting: nausea  Complications: no  Cardiovascular status: blood pressure returned to baseline  Respiratory status: acceptable  Hydration status: euvolemic  Comments: Multiple agents for nausea   Multimodal analgesia pain management approach  Pain management: adequate

## 2023-10-27 NOTE — PROGRESS NOTES
Discharge instructions review with patient and friend. All home medications have been reviewed, pt v/u. Discharge instructions signed. Pt discharged via wheelchair. Pt discharged with discharge paperwork and all belongings. Friend taking stable pt home.

## 2023-10-29 ENCOUNTER — TELEPHONE (OUTPATIENT)
Dept: ORTHOPEDIC SURGERY | Age: 53
End: 2023-10-29

## 2023-10-30 ENCOUNTER — HOSPITAL ENCOUNTER (OUTPATIENT)
Dept: PHYSICAL THERAPY | Age: 53
Setting detail: THERAPIES SERIES
Discharge: HOME | End: 2023-10-30
Payer: COMMERCIAL

## 2023-10-30 PROCEDURE — 97112 NEUROMUSCULAR REEDUCATION: CPT

## 2023-10-30 NOTE — THERAPY RECERTIFICATION
(14880) x 1    [] VASO  [] Manual (54289) x      [] Other:  Gait training   [] TA x      [] Mech Traction (04662)  [] ES(attended) (23242)      [] ES (un) (24228):     GOALS:  Patient stated goal: To get full use of her knee back. [x] Progressing: [] Met: [] Not Met: [] Adjusted    Therapist goals for Patient:   Short Term Goals: To be achieved in: 2 weeks  1. Independent in HEP and progression per patient tolerance, in order to prevent re-injury. [] Progressing: [x] Met: [] Not Met: [] Adjusted  2. Patient will have a decrease in pain to facilitate improvement in movement, function, and ADLs as indicated by Functional Deficits. [] Progressing: [x] Met: [] Not Met: [] Adjusted    Long Term Goals: To be achieved in: 12 weeks  1. Disability index score of 40% or more per LEFS to assist with reaching prior level of function. [x] Progressing: [] Met: [] Not Met: [] Adjusted  2. Patient will demonstrate increased AROM to 0-125 to allow for proper joint functioning as indicated by patients Functional Deficits. [x] Progressing: [] Met: [] Not Met: [] Adjusted  3. Patient will demonstrate an increase in Strength to good proximal hip strength and control, within 20% as tested by biodex in LE to allow for proper functional mobility as indicated by patients Functional Deficits. [x] Progressing: [] Met: [] Not Met: [] Adjusted  4. Patient will return to all ADL's and work related functional activities without increased symptoms or restriction. [x] Progressing: [] Met: [] Not Met: [] Adjusted    Overall Progression Towards Functional goals/ Treatment Progress Update:  [x] Patient is progressing as expected towards functional goals listed. [] Progression is slowed due to complexities/Impairments listed. [] Progression has been slowed due to co-morbidities.   [] Plan just implemented, too soon to assess goals progression <30days   [] Goals require adjustment due to lack of progress  [] Patient is not progressing as

## 2023-11-02 ENCOUNTER — HOSPITAL ENCOUNTER (OUTPATIENT)
Dept: PHYSICAL THERAPY | Age: 53
Setting detail: THERAPIES SERIES
End: 2023-11-02
Payer: COMMERCIAL

## 2023-11-02 ENCOUNTER — APPOINTMENT (OUTPATIENT)
Dept: PHYSICAL THERAPY | Age: 53
End: 2023-11-02
Payer: COMMERCIAL

## 2023-11-02 ENCOUNTER — OFFICE VISIT (OUTPATIENT)
Dept: ORTHOPEDIC SURGERY | Age: 53
End: 2023-11-02

## 2023-11-02 VITALS — BODY MASS INDEX: 19.78 KG/M2 | HEIGHT: 67 IN | WEIGHT: 126 LBS

## 2023-11-02 DIAGNOSIS — S83.281A ACUTE LATERAL MENISCUS TEAR OF RIGHT KNEE, INITIAL ENCOUNTER: Primary | ICD-10-CM

## 2023-11-02 PROCEDURE — 99024 POSTOP FOLLOW-UP VISIT: CPT | Performed by: ORTHOPAEDIC SURGERY

## 2023-11-02 RX ORDER — ERGOCALCIFEROL 1.25 MG/1
50000 CAPSULE ORAL WEEKLY
Qty: 12 CAPSULE | Refills: 1 | Status: SHIPPED | OUTPATIENT
Start: 2023-11-02

## 2023-11-03 ENCOUNTER — HOSPITAL ENCOUNTER (OUTPATIENT)
Dept: PHYSICAL THERAPY | Age: 53
Setting detail: THERAPIES SERIES
Discharge: HOME | End: 2023-11-03

## 2023-11-03 PROCEDURE — 97112 NEUROMUSCULAR REEDUCATION: CPT | Performed by: SPECIALIST/TECHNOLOGIST

## 2023-11-06 ENCOUNTER — APPOINTMENT (OUTPATIENT)
Dept: PHYSICAL THERAPY | Age: 53
End: 2023-11-06
Payer: COMMERCIAL

## 2023-11-06 ENCOUNTER — HOSPITAL ENCOUNTER (OUTPATIENT)
Dept: PHYSICAL THERAPY | Age: 53
Setting detail: THERAPIES SERIES
Discharge: HOME | End: 2023-11-06

## 2023-11-06 PROCEDURE — 97112 NEUROMUSCULAR REEDUCATION: CPT

## 2023-11-06 NOTE — FLOWSHEET NOTE
re-education (50630) Sets/sec Notes/CUES   Heel slides 10x10\"    Quad sets NMES 10\"/10\"x10'    Weight shift X20 R/L    HR X 20                                        Manual Intervention (20229)                 HEP instruction: verbally discussed, will formally asses s next visit  Therapeutic Exercise and NMR EXR  [x] (65674) Provided verbal/tactile cueing for activities related to strengthening, flexibility, endurance, ROM for improvements in LE, proximal hip, and core control with self care, mobility, lifting, ambulation.  [] (29773) Provided verbal/tactile cueing for activities related to improving balance, coordination, kinesthetic sense, posture, motor skill, proprioception  to assist with LE, proximal hip, and core control in self care, mobility, lifting, ambulation and eccentric single leg control.      NMR and Therapeutic Activities:    [x] (98495 or 04883) Provided verbal/tactile cueing for activities related to improving balance, coordination, kinesthetic sense, posture, motor skill, proprioception and motor activation to allow for proper function of core, proximal hip and LE with self care and ADLs  [] (53009) Gait Re-education- Provided training and instruction to the patient for proper LE, core and proximal hip recruitment and positioning and eccentric body weight control with ambulation re-education including up and down stairs     Home Exercise Program:    [x] (35372) Reviewed/Progressed HEP activities related to strengthening, flexibility, endurance, ROM of core, proximal hip and LE for functional self-care, mobility, lifting and ambulation/stair navigation   [] (57082)Reviewed/Progressed HEP activities related to improving balance, coordination, kinesthetic sense, posture, motor skill, proprioception of core, proximal hip and LE for self care, mobility, lifting, and ambulation/stair navigation      Manual Treatments:  PROM / STM / Oscillations-Mobs:  G-I, II, III, IV (PA's, Inf., Post.)  [] (76478)

## 2023-11-09 ENCOUNTER — APPOINTMENT (OUTPATIENT)
Dept: PHYSICAL THERAPY | Age: 53
End: 2023-11-09
Payer: COMMERCIAL

## 2023-11-10 ENCOUNTER — HOSPITAL ENCOUNTER (OUTPATIENT)
Dept: PHYSICAL THERAPY | Age: 53
Setting detail: THERAPIES SERIES
Discharge: HOME | End: 2023-11-10

## 2023-11-10 PROCEDURE — 97112 NEUROMUSCULAR REEDUCATION: CPT

## 2023-11-10 NOTE — FLOWSHEET NOTE
(07410)/NMR re-education (31771) Sets/sec Notes/CUES   Heel slides 10x10\"    Quad sets NMES 10\"/10\"x10'    SLR 2x10    Weight shift X20 R/L    HR X 20                                        Manual Intervention (61557)                 HEP instruction: verbally discussed, will formally asses s next visit  Therapeutic Exercise and NMR EXR  [x] (94329) Provided verbal/tactile cueing for activities related to strengthening, flexibility, endurance, ROM for improvements in LE, proximal hip, and core control with self care, mobility, lifting, ambulation.  [] (82088) Provided verbal/tactile cueing for activities related to improving balance, coordination, kinesthetic sense, posture, motor skill, proprioception  to assist with LE, proximal hip, and core control in self care, mobility, lifting, ambulation and eccentric single leg control.      NMR and Therapeutic Activities:    [x] (07710 or 07106) Provided verbal/tactile cueing for activities related to improving balance, coordination, kinesthetic sense, posture, motor skill, proprioception and motor activation to allow for proper function of core, proximal hip and LE with self care and ADLs  [] (84416) Gait Re-education- Provided training and instruction to the patient for proper LE, core and proximal hip recruitment and positioning and eccentric body weight control with ambulation re-education including up and down stairs     Home Exercise Program:    [x] (88781) Reviewed/Progressed HEP activities related to strengthening, flexibility, endurance, ROM of core, proximal hip and LE for functional self-care, mobility, lifting and ambulation/stair navigation   [] (16899)Reviewed/Progressed HEP activities related to improving balance, coordination, kinesthetic sense, posture, motor skill, proprioception of core, proximal hip and LE for self care, mobility, lifting, and ambulation/stair navigation      Manual Treatments:  PROM / STM / Oscillations-Mobs:  G-I, II, III, IV (PA's,

## 2023-11-13 ENCOUNTER — APPOINTMENT (OUTPATIENT)
Dept: PHYSICAL THERAPY | Age: 53
End: 2023-11-13
Payer: COMMERCIAL

## 2023-11-14 ENCOUNTER — HOSPITAL ENCOUNTER (OUTPATIENT)
Dept: PHYSICAL THERAPY | Age: 53
Setting detail: THERAPIES SERIES
Discharge: HOME | End: 2023-11-14

## 2023-11-14 PROCEDURE — 97112 NEUROMUSCULAR REEDUCATION: CPT

## 2023-11-16 ENCOUNTER — APPOINTMENT (OUTPATIENT)
Dept: PHYSICAL THERAPY | Age: 53
End: 2023-11-16
Payer: COMMERCIAL

## 2023-11-17 ENCOUNTER — TELEPHONE (OUTPATIENT)
Dept: ORTHOPEDIC SURGERY | Age: 53
End: 2023-11-17

## 2023-11-17 ENCOUNTER — HOSPITAL ENCOUNTER (OUTPATIENT)
Dept: PHYSICAL THERAPY | Age: 53
Setting detail: THERAPIES SERIES
Discharge: HOME | End: 2023-11-17

## 2023-11-17 PROCEDURE — 97112 NEUROMUSCULAR REEDUCATION: CPT

## 2023-11-17 NOTE — FLOWSHEET NOTE
all ADL's and work related functional activities without increased symptoms or restriction. [x] Progressing: [] Met: [] Not Met: [] Adjusted    Overall Progression Towards Functional goals/ Treatment Progress Update:  [x] Patient is progressing as expected towards functional goals listed. [] Progression is slowed due to complexities/Impairments listed. [] Progression has been slowed due to co-morbidities. [] Plan just implemented, too soon to assess goals progression <30days   [] Goals require adjustment due to lack of progress  [] Patient is not progressing as expected and requires additional follow up with physician  [] Other    Prognosis for POC: [x] Good [] Fair  [] Poor      Patient requires continued skilled intervention: [x] Yes  [] No    Treatment/Activity Tolerance:  [x] Patient able to complete treatment  [] Patient limited by fatigue  [] Patient limited by pain    [] Patient limited by other medical complications  [] Other:     ASSESSMENT: Katie Mcconnell is making expected improvement s/p ACLr with LET, however continues to demonstrate obvious deficits in her strength, range of motion and activity tolerance. She would continue to benefit from skilled physical therapy to maximize her functional outcomes and progress towards goal.        PLAN: See eval  [] Continue per plan of care [x] Alter current plan (see comments above)  [] Plan of care initiated [] Hold pending MD visit [] Discharge    Electronically signed by  Tanya Davila PT, DPT, SCS    Note: If patient does not return for scheduled/ recommended follow up visits, this note will serve as a discharge from care along with most recent update on progress.

## 2023-11-20 ENCOUNTER — APPOINTMENT (OUTPATIENT)
Dept: PHYSICAL THERAPY | Age: 53
End: 2023-11-20
Payer: COMMERCIAL

## 2023-11-20 ENCOUNTER — HOSPITAL ENCOUNTER (OUTPATIENT)
Dept: PHYSICAL THERAPY | Age: 53
Setting detail: THERAPIES SERIES
Discharge: HOME | End: 2023-11-20

## 2023-11-20 PROCEDURE — 97112 NEUROMUSCULAR REEDUCATION: CPT

## 2023-11-22 ENCOUNTER — APPOINTMENT (OUTPATIENT)
Dept: PHYSICAL THERAPY | Age: 53
End: 2023-11-22
Payer: COMMERCIAL

## 2023-11-22 ENCOUNTER — HOSPITAL ENCOUNTER (OUTPATIENT)
Dept: PHYSICAL THERAPY | Age: 53
Setting detail: THERAPIES SERIES
Discharge: HOME | End: 2023-11-22

## 2023-11-22 PROCEDURE — 97112 NEUROMUSCULAR REEDUCATION: CPT

## 2023-11-27 ENCOUNTER — HOSPITAL ENCOUNTER (OUTPATIENT)
Dept: PHYSICAL THERAPY | Age: 53
Setting detail: THERAPIES SERIES
Discharge: HOME | End: 2023-11-27
Payer: COMMERCIAL

## 2023-11-27 ENCOUNTER — OFFICE VISIT (OUTPATIENT)
Dept: ORTHOPEDIC SURGERY | Age: 53
End: 2023-11-27

## 2023-11-27 ENCOUNTER — APPOINTMENT (OUTPATIENT)
Dept: PHYSICAL THERAPY | Age: 53
End: 2023-11-27
Payer: COMMERCIAL

## 2023-11-27 VITALS — BODY MASS INDEX: 19.78 KG/M2 | WEIGHT: 126 LBS | HEIGHT: 67 IN

## 2023-11-27 DIAGNOSIS — S83.281A ACUTE LATERAL MENISCUS TEAR OF RIGHT KNEE, INITIAL ENCOUNTER: Primary | ICD-10-CM

## 2023-11-27 PROCEDURE — 97112 NEUROMUSCULAR REEDUCATION: CPT

## 2023-11-27 PROCEDURE — 99024 POSTOP FOLLOW-UP VISIT: CPT | Performed by: ORTHOPAEDIC SURGERY

## 2023-11-29 ENCOUNTER — HOSPITAL ENCOUNTER (OUTPATIENT)
Dept: PHYSICAL THERAPY | Age: 53
Setting detail: THERAPIES SERIES
Discharge: HOME | End: 2023-11-29
Payer: COMMERCIAL

## 2023-11-29 PROCEDURE — 97112 NEUROMUSCULAR REEDUCATION: CPT

## 2023-11-30 ENCOUNTER — APPOINTMENT (OUTPATIENT)
Dept: PHYSICAL THERAPY | Age: 53
End: 2023-11-30
Payer: COMMERCIAL

## 2023-12-01 ENCOUNTER — HOSPITAL ENCOUNTER (OUTPATIENT)
Dept: PHYSICAL THERAPY | Age: 53
Setting detail: THERAPIES SERIES
Discharge: HOME | End: 2023-12-01

## 2023-12-01 PROCEDURE — 97112 NEUROMUSCULAR REEDUCATION: CPT

## 2023-12-01 NOTE — FLOWSHEET NOTE
and LE for self care, mobility, lifting, and ambulation/stair navigation      Manual Treatments:  PROM / STM / Oscillations-Mobs:  G-I, II, III, IV (PA's, Inf., Post.)  [] (97301) Provided manual therapy to mobilize LE, proximal hip and/or LS spine soft tissue/joints for the purpose of modulating pain, promoting relaxation,  increasing ROM, reducing/eliminating soft tissue swelling/inflammation/restriction, improving soft tissue extensibility and allowing for proper ROM for normal function with self care, mobility, lifting and ambulation. Charges  Timed Code Treatment Minutes: 15   Total Treatment Minutes: 45     HIGH ESTIMATE PATIENT    [] FL(16866) x      [] IONTO  [x] NMR (30959) x 1    [] VASO  [] Manual (59646) x      [] Other:  Gait training   [] TA x      [] Mech Traction (19890)  [] ES(attended) (96290)      [] ES (un) (77757):     GOALS:  Patient stated goal: To get full use of her knee back. [x] Progressing: [] Met: [] Not Met: [] Adjusted    Therapist goals for Patient:   Short Term Goals: To be achieved in: 2 weeks  1. Independent in HEP and progression per patient tolerance, in order to prevent re-injury. [] Progressing: [x] Met: [] Not Met: [] Adjusted  2. Patient will have a decrease in pain to facilitate improvement in movement, function, and ADLs as indicated by Functional Deficits. [] Progressing: [x] Met: [] Not Met: [] Adjusted    Long Term Goals: To be achieved in: 12 weeks  1. Disability index score of 40% or more per LEFS to assist with reaching prior level of function. [x] Progressing: [] Met: [] Not Met: [] Adjusted  2. Patient will demonstrate increased AROM to 0-125 to allow for proper joint functioning as indicated by patients Functional Deficits. [x] Progressing: [] Met: [] Not Met: [] Adjusted  3.  Patient will demonstrate an increase in Strength to good proximal hip strength and control, within 20% as tested by biodex in LE to allow for proper functional mobility as

## 2023-12-04 ENCOUNTER — HOSPITAL ENCOUNTER (OUTPATIENT)
Dept: PHYSICAL THERAPY | Age: 53
Setting detail: THERAPIES SERIES
Discharge: HOME | End: 2023-12-04

## 2023-12-04 PROCEDURE — 97112 NEUROMUSCULAR REEDUCATION: CPT

## 2023-12-04 NOTE — FLOWSHEET NOTE
complexities/Impairments listed. [] Progression has been slowed due to co-morbidities. [] Plan just implemented, too soon to assess goals progression <30days   [] Goals require adjustment due to lack of progress  [] Patient is not progressing as expected and requires additional follow up with physician  [] Other    Prognosis for POC: [x] Good [] Fair  [] Poor      Patient requires continued skilled intervention: [x] Yes  [] No    Treatment/Activity Tolerance:  [x] Patient able to complete treatment  [] Patient limited by fatigue  [] Patient limited by pain    [] Patient limited by other medical complications  [] Other:     ASSESSMENT: See notes above    PLAN: See eval  [x] Continue per plan of care [] Alter current plan (see comments above)  [] Plan of care initiated [] Hold pending MD visit [] Discharge    Electronically signed by  Moises Montaño PT, DPT, SCS    Note: If patient does not return for scheduled/ recommended follow up visits, this note will serve as a discharge from care along with most recent update on progress.

## 2023-12-06 ENCOUNTER — HOSPITAL ENCOUNTER (OUTPATIENT)
Dept: PHYSICAL THERAPY | Age: 53
Setting detail: THERAPIES SERIES
Discharge: HOME | End: 2023-12-06

## 2023-12-06 PROCEDURE — 97112 NEUROMUSCULAR REEDUCATION: CPT

## 2023-12-06 NOTE — FLOWSHEET NOTE
and LE for self care, mobility, lifting, and ambulation/stair navigation      Manual Treatments:  PROM / STM / Oscillations-Mobs:  G-I, II, III, IV (PA's, Inf., Post.)  [] (54074) Provided manual therapy to mobilize LE, proximal hip and/or LS spine soft tissue/joints for the purpose of modulating pain, promoting relaxation,  increasing ROM, reducing/eliminating soft tissue swelling/inflammation/restriction, improving soft tissue extensibility and allowing for proper ROM for normal function with self care, mobility, lifting and ambulation. Charges  Timed Code Treatment Minutes: 15   Total Treatment Minutes: 45     HIGH ESTIMATE PATIENT    [] HW(99435) x      [] IONTO  [x] NMR (27203) x 1    [] VASO  [] Manual (94996) x      [] Other:  Gait training   [] TA x      [] Mech Traction (50474)  [] ES(attended) (80154)      [] ES (un) (43342):     GOALS:  Patient stated goal: To get full use of her knee back. [x] Progressing: [] Met: [] Not Met: [] Adjusted    Therapist goals for Patient:   Short Term Goals: To be achieved in: 2 weeks  1. Independent in HEP and progression per patient tolerance, in order to prevent re-injury. [] Progressing: [x] Met: [] Not Met: [] Adjusted  2. Patient will have a decrease in pain to facilitate improvement in movement, function, and ADLs as indicated by Functional Deficits. [] Progressing: [x] Met: [] Not Met: [] Adjusted    Long Term Goals: To be achieved in: 12 weeks  1. Disability index score of 40% or more per LEFS to assist with reaching prior level of function. [x] Progressing: [] Met: [] Not Met: [] Adjusted  2. Patient will demonstrate increased AROM to 0-125 to allow for proper joint functioning as indicated by patients Functional Deficits. [x] Progressing: [] Met: [] Not Met: [] Adjusted  3.  Patient will demonstrate an increase in Strength to good proximal hip strength and control, within 20% as tested by biodex in LE to allow for proper functional mobility as

## 2023-12-08 ENCOUNTER — HOSPITAL ENCOUNTER (OUTPATIENT)
Dept: PHYSICAL THERAPY | Age: 53
Setting detail: THERAPIES SERIES
Discharge: HOME | End: 2023-12-08

## 2023-12-08 PROCEDURE — 97112 NEUROMUSCULAR REEDUCATION: CPT

## 2023-12-11 ENCOUNTER — HOSPITAL ENCOUNTER (OUTPATIENT)
Dept: PHYSICAL THERAPY | Age: 53
Setting detail: THERAPIES SERIES
Discharge: HOME | End: 2023-12-11

## 2023-12-11 NOTE — FLOWSHEET NOTE
[facility] and [address] [place] [address] [address],  [facility], [address] [place] [address], [address] [place] [address] Avenue  Phone: [phone]       Fax: [phone]        Physical Therapy  Cancellation/No-show Note  Patient Name:  Gillian Sood  :  1970   Date:  2023  Cancelled visits to date: 2  No-shows to date: 0    For today's appointment patient:  [x]  Cancelled  []  Rescheduled appointment  []  No-show     Reason given by patient:  []  Patient ill  []  Conflicting appointment  []  No transportation    []  Conflict with work  []  No reason given  [x]  Other:  Car battery    Comments:      Electronically signed by:  Du Hernandez, PT, DPT, SCS

## 2023-12-13 ENCOUNTER — HOSPITAL ENCOUNTER (OUTPATIENT)
Dept: PHYSICAL THERAPY | Age: 53
Setting detail: THERAPIES SERIES
Discharge: HOME | End: 2023-12-13

## 2023-12-13 PROCEDURE — 97112 NEUROMUSCULAR REEDUCATION: CPT

## 2023-12-15 ENCOUNTER — HOSPITAL ENCOUNTER (OUTPATIENT)
Dept: PHYSICAL THERAPY | Age: 53
Setting detail: THERAPIES SERIES
Discharge: HOME | End: 2023-12-15

## 2023-12-15 PROCEDURE — 97112 NEUROMUSCULAR REEDUCATION: CPT

## 2023-12-27 ENCOUNTER — HOSPITAL ENCOUNTER (OUTPATIENT)
Dept: PHYSICAL THERAPY | Age: 53
Setting detail: THERAPIES SERIES
Discharge: HOME | End: 2023-12-27

## 2023-12-27 PROCEDURE — 97112 NEUROMUSCULAR REEDUCATION: CPT

## 2023-12-29 ENCOUNTER — HOSPITAL ENCOUNTER (OUTPATIENT)
Dept: PHYSICAL THERAPY | Age: 53
Setting detail: THERAPIES SERIES
Discharge: HOME | End: 2023-12-29

## 2023-12-29 PROCEDURE — 97112 NEUROMUSCULAR REEDUCATION: CPT

## 2023-12-29 NOTE — FLOWSHEET NOTE
Poor      Patient requires continued skilled intervention: [x] Yes  [] No    Treatment/Activity Tolerance:  [x] Patient able to complete treatment  [] Patient limited by fatigue  [] Patient limited by pain    [] Patient limited by other medical complications  [] Other:     ASSESSMENT: Brisa Flores continues to make good improvements in her activity tolerance. We continue to work hard on functional quad activation and loaded termingal knee extension. She would continue to benefit from skilled physical therapy to maximize her functional outcomes and progress towards goals. PLAN: See eval  [x] Continue per plan of care [] Alter current plan (see comments above)  [] Plan of care initiated [] Hold pending MD visit [] Discharge    Electronically signed by  Kathy Kumar PT, DPT, SCS    Note: If patient does not return for scheduled/ recommended follow up visits, this note will serve as a discharge from care along with most recent update on progress.

## 2024-01-02 ENCOUNTER — HOSPITAL ENCOUNTER (OUTPATIENT)
Dept: PHYSICAL THERAPY | Age: 54
Setting detail: THERAPIES SERIES
Discharge: HOME | End: 2024-01-02
Payer: COMMERCIAL

## 2024-01-02 PROCEDURE — 97112 NEUROMUSCULAR REEDUCATION: CPT

## 2024-01-02 PROCEDURE — 97110 THERAPEUTIC EXERCISES: CPT

## 2024-01-02 NOTE — FLOWSHEET NOTE
[facility] [place],  [facility], [address] [address]  Phone: [phone]  Fax: [phone]      Physical Therapy Treatment Note/ Progress Report:           Date:  2024    Patient Name:  Eufemia Joshi    :  1970  MRN: [id number]  Restrictions/Precautions:    Medical/Treatment Diagnosis Information:  Old complete tear of anterior cruciate ligament of right knee [M23.51]; Right knee pain, unspecified chronicity [M25.561]         Right knee pain [M25.561]                                        Insurance information: BCBS, 25 hard max, $25 CP  Physician Information:  Jones Nunez MD  Has the plan of care been signed (Y/N):        [x]  Yes  []  No     Date of Patient follow up with Physician: 24      Is this a Progress Report:     []  Yes  [x]  No        If Yes:  Date Range for reporting period:  Beginning 23  Ending     Progress report will be due (10 Rx or 30 days whichever is less): 24        Visit # Insurance Allowable Auth Required   In-person 46 (6 previously used) 25 hard max []  Yes [x]  No        Functional Scale: LEFI     Date assessed:  7/10/23           LEFI     Date assessed:  8/15/23         LEFI     Date assessed:  23         LEFI  82% Disability        10/30/23         LEFI  72% disability          23      Number of Comorbidities:  [x]0     []1-2    []3+    Latex Allergy:  [x]NO      []YES  Preferred Language for Healthcare:   [x]English       []other:      Pain level:  0-1/10     SUBJECTIVE:  Pt reports that she has tried walking without her crutch or brace since last visit and has felt awkward but stable      OBJECTIVE: See eval  Observation:   Test measurements:  Knee Ext to 0; Flex to 85 (heel slide)           Knee ext 0°; Flex 105 (Heel slide) = 23     RESTRICTIONS/PRECAUTIONS: HIGH

## 2024-01-04 ENCOUNTER — HOSPITAL ENCOUNTER (OUTPATIENT)
Dept: PHYSICAL THERAPY | Age: 54
Setting detail: THERAPIES SERIES
Discharge: HOME | End: 2024-01-04
Payer: COMMERCIAL

## 2024-01-04 PROCEDURE — 97112 NEUROMUSCULAR REEDUCATION: CPT

## 2024-01-04 PROCEDURE — 97110 THERAPEUTIC EXERCISES: CPT

## 2024-01-04 NOTE — FLOWSHEET NOTE
[facility] [place],  [facility] Performance and Rehabilitation, [address] [address]  Phone: [phone]  Fax: [phone]      Physical Therapy Treatment Note/ Progress Report:           Date:  2024    Patient Name:  Eufemia Joshi    :  1970  MRN: [id number]  Restrictions/Precautions:    Medical/Treatment Diagnosis Information:  Old complete tear of anterior cruciate ligament of right knee [M23.51]; Right knee pain, unspecified chronicity [M25.561]         Right knee pain [M25.561]                                        Insurance information: BCBS, 25 hard max, $25 CP  Physician Information:  Jones Nunez MD  Has the plan of care been signed (Y/N):        [x]  Yes  []  No     Date of Patient follow up with Physician: 24      Is this a Progress Report:     []  Yes  [x]  No        If Yes:  Date Range for reporting period:  Beginning 23  Ending     Progress report will be due (10 Rx or 30 days whichever is less): 24        Visit # Insurance Allowable Auth Required   In-person 47 (6 previously used) 25 hard max []  Yes [x]  No        Functional Scale: LEFI     Date assessed:  7/10/23           LEFI     Date assessed:  8/15/23         LEFI     Date assessed:  23         LEFI  82% Disability        10/30/23         LEFI  72% disability          23      Number of Comorbidities:  [x]0     []1-2    []3+    Latex Allergy:  [x]NO      []YES  Preferred Language for Healthcare:   [x]English       []other:      Pain level:  0-1/10     SUBJECTIVE:  Pt reports that she has been feeling better with her walking, has tried to focus less on the little issues      OBJECTIVE: See eval  Observation:   Test measurements:  Knee Ext to 0; Flex to 85 (heel slide)           Knee ext 0°; Flex 105 (Heel slide) = 23     RESTRICTIONS/PRECAUTIONS: HIGH ESTIMATE

## 2024-01-08 ENCOUNTER — APPOINTMENT (OUTPATIENT)
Dept: PHYSICAL THERAPY | Age: 54
End: 2024-01-08
Payer: COMMERCIAL

## 2024-01-08 ENCOUNTER — OFFICE VISIT (OUTPATIENT)
Dept: ORTHOPEDIC SURGERY | Age: 54
End: 2024-01-08

## 2024-01-08 VITALS — HEIGHT: 67 IN | WEIGHT: 126 LBS | BODY MASS INDEX: 19.78 KG/M2

## 2024-01-08 DIAGNOSIS — G89.18 POST-OPERATIVE PAIN: Primary | ICD-10-CM

## 2024-01-08 PROCEDURE — 99024 POSTOP FOLLOW-UP VISIT: CPT | Performed by: ORTHOPAEDIC SURGERY

## 2024-01-10 ENCOUNTER — APPOINTMENT (OUTPATIENT)
Dept: PHYSICAL THERAPY | Age: 54
End: 2024-01-10
Payer: COMMERCIAL

## 2024-01-10 ENCOUNTER — HOSPITAL ENCOUNTER (OUTPATIENT)
Dept: PHYSICAL THERAPY | Age: 54
Setting detail: THERAPIES SERIES
Discharge: HOME | End: 2024-01-10
Payer: COMMERCIAL

## 2024-01-10 ENCOUNTER — OFFICE VISIT (OUTPATIENT)
Dept: ORTHOPEDIC SURGERY | Age: 54
End: 2024-01-10
Payer: COMMERCIAL

## 2024-01-10 VITALS — BODY MASS INDEX: 19.78 KG/M2 | WEIGHT: 126 LBS | HEIGHT: 67 IN

## 2024-01-10 DIAGNOSIS — S92.352A CLOSED DISPLACED FRACTURE OF FIFTH METATARSAL BONE OF LEFT FOOT, INITIAL ENCOUNTER: Primary | ICD-10-CM

## 2024-01-10 PROCEDURE — 97112 NEUROMUSCULAR REEDUCATION: CPT

## 2024-01-10 PROCEDURE — 97110 THERAPEUTIC EXERCISES: CPT

## 2024-01-10 PROCEDURE — 99213 OFFICE O/P EST LOW 20 MIN: CPT | Performed by: ORTHOPAEDIC SURGERY

## 2024-01-10 NOTE — THERAPY RECERTIFICATION
[facility] [place],  [facility] Rehabilitation, [address] [address]  Phone: [phone]  Fax: [phone]      Physical Therapy Treatment Note/ Progress Report:           Date:  1/10/2024    Patient Name:  Eufemia Joshi    :  1970  MRN: [id number]  Restrictions/Precautions:    Medical/Treatment Diagnosis Information:  Old complete tear of anterior cruciate ligament of right knee [M23.51]; Right knee pain, unspecified chronicity [M25.561]         Right knee pain [M25.561]                                        Insurance information: BCBS, 25 hard max, $25 CP  Physician Information:  Jones Nunez MD  Has the plan of care been signed (Y/N):        [x]  Yes  []  No     Date of Patient follow up with Physician: 24      Is this a Progress Report:     [x]  Yes  []  No        If Yes:  Date Range for reporting period:  Beginning 23  Ending 1/10/24    Progress report will be due (10 Rx or 30 days whichever is less): 2/10/24        Visit # Insurance Allowable Auth Required   In-person 48 (6 previously used) 25 hard max []  Yes [x]  No        Functional Scale: LEFI     Date assessed:  7/10/23           LEFI     Date assessed:  8/15/23         LEFI     Date assessed:  23         LEFI  82% Disability        10/30/23         LEFI  72% disability          23         LEFI:  69% Disability         1/10/24      Number of Comorbidities:  [x]0     []1-2    []3+    Latex Allergy:  [x]NO      []YES  Preferred Language for Healthcare:   [x]English       []other:      Pain level:  0-1/10     SUBJECTIVE:  Pt reports that she did see Dr. Nunez on Monday who seemed to be a little concerned with her amount of full extension but was overall very happy      OBJECTIVE: See eval  Observation:   Test measurements:  Knee Ext to 0; Flex to 85 (heel slide)

## 2024-01-10 NOTE — PROGRESS NOTES
CHIEF COMPLAINT: Left foot pain/ 5th MT shaft minimally displaced fracture.    DATE OF INJURY:  8/3/2023, DOT 8/4/2023    HISTORY:  Ms. Joshi 53 y.o.   female  presents today for f/u evaluation of a left foot injury which occurred when she fell.   She is complaining of lateral foot pain and swelling. Rates pain a 3/10 VAS.  This is better with elevation and worse with bearing any wt.  The pain is sharp and not radiating. No other complaint. She was seen 1st at [facility], where she was x-rayed and splinted and asked to f/u with orthopaedics. She is a NP for 10 years. She will have a revision ACL on 9/15/2023 by Dr Nunez after was done at .    Past Medical History:   Diagnosis Date    PONV (postoperative nausea and vomiting)     Thyroid disease        Past Surgical History:   Procedure Laterality Date    KNEE ARTHROSCOPY W/ ACL RECONSTRUCTION Right     KNEE SURGERY Right 10/27/2023    RIGHT KNEE PIVOT SHIFT UNDER ANESTHESIA; RIGHT KNEE ARTHROSCOPY; ANTERIOR CRUCIATE LIGAMENT RECONSTRUCTION WITH BONE TO BONE AUTOGRAFT; LATERAL EXTRA ARTICULAR TENODESIS;-DEPUY MITEK performed by Jones Nunez MD at [facility] OR       Social History     Socioeconomic History    Marital status: Single     Spouse name: Not on file    Number of children: Not on file    Years of education: Not on file    Highest education level: Not on file   Occupational History    Occupation: nurse practitioner     Employer: [employer]   Tobacco Use    Smoking status: Never    Smokeless tobacco: Never   Vaping Use    Vaping Use: Never used   Substance and Sexual Activity    Alcohol use: Never    Drug use: Never    Sexual activity: Not on file   Other Topics Concern    Not on file   Social History Narrative    Not on file     Social Determinants of Health     Financial Resource Strain: Not on file   Food Insecurity: Not on file   Transportation Needs: Not on file   Physical Activity: Not on file   Stress: Not on file   Social Connections:

## 2024-01-12 ENCOUNTER — HOSPITAL ENCOUNTER (OUTPATIENT)
Dept: PHYSICAL THERAPY | Age: 54
Setting detail: THERAPIES SERIES
Discharge: HOME | End: 2024-01-12
Payer: COMMERCIAL

## 2024-01-12 ENCOUNTER — APPOINTMENT (OUTPATIENT)
Dept: PHYSICAL THERAPY | Age: 54
End: 2024-01-12
Payer: COMMERCIAL

## 2024-01-12 PROCEDURE — 97110 THERAPEUTIC EXERCISES: CPT

## 2024-01-12 PROCEDURE — 97112 NEUROMUSCULAR REEDUCATION: CPT

## 2024-01-12 NOTE — THERAPY RECERTIFICATION
[facility] Sports [facility] [place],  Sports Performance and Rehabilitation, [address] [address]  Phone: [phone]  Fax: [phone]      Physical Therapy Treatment Note/ Progress Report:           Date:  2024    Patient Name:  Eufemia Joshi    :  1970  MRN: [id number]  Restrictions/Precautions:    Medical/Treatment Diagnosis Information:  Old complete tear of anterior cruciate ligament of right knee [M23.51]; Right knee pain, unspecified chronicity [M25.561]         Right knee pain [M25.561]                                        Insurance information: BCBS, 50 hard max; $25  Physician Information:  Jones Nunez MD  Has the plan of care been signed (Y/N):        [x]  Yes  []  No     Date of Patient follow up with Physician: 24      Is this a Progress Report:     []  Yes  []  No        If Yes:  Date Range for reporting period:  Beginning 1/10/24  Ending     Progress report will be due (10 Rx or 30 days whichever is less): 2/10/24        Visit # Insurance Allowable Auth Required   In-person 2024 (6 previously used) 50 []  Yes [x]  No        Functional Scale: LEFI     Date assessed:  7/10/23           LEFI     Date assessed:  8/15/23         LEFI     Date assessed:  23         LEFI  82% Disability        10/30/23         LEFI  72% disability          23         LEFI:  69% Disability         1/10/24      Number of Comorbidities:  [x]0     []1-2    []3+    Latex Allergy:  [x]NO      []YES  Preferred Language for Healthcare:   [x]English       []other:      Pain level:  0-1/10     SUBJECTIVE:  Pt reports that she is feeling pretty good overall today, a little more unsteady with the weather outside so she did bring her crutch      OBJECTIVE: See eval  Observation:   Test measurements:  Knee Ext to 0; Flex to 85 (heel slide)           Knee ext 0°;

## 2024-01-15 ENCOUNTER — HOSPITAL ENCOUNTER (OUTPATIENT)
Dept: PHYSICAL THERAPY | Age: 54
Setting detail: THERAPIES SERIES
Discharge: HOME | End: 2024-01-15
Payer: COMMERCIAL

## 2024-01-15 ENCOUNTER — APPOINTMENT (OUTPATIENT)
Dept: PHYSICAL THERAPY | Age: 54
End: 2024-01-15
Payer: COMMERCIAL

## 2024-01-15 PROCEDURE — 97112 NEUROMUSCULAR REEDUCATION: CPT

## 2024-01-15 PROCEDURE — 97110 THERAPEUTIC EXERCISES: CPT

## 2024-01-15 NOTE — FLOWSHEET NOTE
(heel slide)           Knee ext 0°; Flex 105 (Heel slide) = 11/22/23           Knee ext to lacking 3 w/o OP ; to 0 with slight OP; Knee flex to 125 w/o OP    RESTRICTIONS/PRECAUTIONS: HIGH ESTIMATE PATIENT previous ACLr 11/2022  DOS: 10/27/23    Exercises/Interventions:     Therapeutic Ex (22644)/NMR re-education (66525) Sets/sec Notes/CUES   Bike 5' Calf Raise 2x12 Calf Stretch 2x20\" SLR 30x 15x 15 x15  2#; W/ BFR   LAQ  4x10x3\" 2#; W/ BFR   Hamstring Bridge on SB 2x10x3\"      CC resisted walking X5 fwd/bck 30#   JESSICA abd  JESSICA TKE 3x10 R/L  20x5\" 55#  55#                      Manual Intervention (23062)                 HEP instruction: verbally discussed, will formally assess next visit  Smart Cuff Size:  LOP:  PTP (60-80% of LOP):   Exercise 10 rep Max Repetition Weight Repetitions   SLR  2# 30, 15, 15, 15   LAQ  2# 15, 15, 15, 15   BFR protocol with Reps of 30/15/15/15 with 30-60 seconds rest in between sets and cuff depressed between exercises. Cuff pressure set at 60-80% of LOP measured with doppler ultrasound at posterior tibial pulse and/or dorsalis pedis pulse.     Patient was fully educated on Blood Flow Restriction (BFR) protocol this visit; this included how to properly don/doff Smart Cuff as well as how to properly inflate Smart Cuff.  They were educated about what symptoms to monitor for while performing BFR and were instructed to immediately stop BFR and release pressure if they experience any numbness/tingling in extremity, intense pain beneath cuff, loss of sensation in extremity or feeling of coldness in extremity. The patient has been medically cleared by MD for initiation of BFR therapy and verbal consent was obtained from patient / - patient's guardian prior to initiation of BFR therapy.  Therapeutic Exercise and NMR EXR  [x] (41717) Provided verbal/tactile cueing for activities related to strengthening, flexibility, endurance, ROM for improvements in LE, proximal hip, and core control with self

## 2024-01-17 ENCOUNTER — TELEPHONE (OUTPATIENT)
Dept: ORTHOPEDIC SURGERY | Age: 54
End: 2024-01-17

## 2024-01-17 ENCOUNTER — APPOINTMENT (OUTPATIENT)
Dept: PHYSICAL THERAPY | Age: 54
End: 2024-01-17
Payer: COMMERCIAL

## 2024-01-17 ENCOUNTER — HOSPITAL ENCOUNTER (OUTPATIENT)
Dept: PHYSICAL THERAPY | Age: 54
Setting detail: THERAPIES SERIES
Discharge: HOME | End: 2024-01-17
Payer: COMMERCIAL

## 2024-01-17 PROCEDURE — 97112 NEUROMUSCULAR REEDUCATION: CPT

## 2024-01-17 PROCEDURE — 97110 THERAPEUTIC EXERCISES: CPT

## 2024-01-17 NOTE — FLOWSHEET NOTE
[facility] [place],  Sports [facility] Rehabilitation, [address] [address]  Phone: [phone]  Fax: [phone]      Physical Therapy Treatment Note:           Date:  2024    Patient Name:  Eufemia Joshi    :  1970  MRN: [id number]  Restrictions/Precautions:    Medical/Treatment Diagnosis Information:  Old complete tear of anterior cruciate ligament of right knee [M23.51]; Right knee pain, unspecified chronicity [M25.561]         Right knee pain [M25.561]                                        Insurance information: BCBS, 50 hard max; $25  Physician Information:  Jones Nunez MD  Has the plan of care been signed (Y/N):        [x]  Yes  []  No     Date of Patient follow up with Physician: 24      Is this a Progress Report:     []  Yes  [x]  No        If Yes:  Date Range for reporting period:  Beginning 1/10/24  Ending     Progress report will be due (10 Rx or 30 days whichever is less): 2/10/24        Visit # Insurance Allowable Auth Required   In-person 2024 (6 previously used) 50 []  Yes [x]  No        Functional Scale: LEFI     Date assessed:  7/10/23           LEFI     Date assessed:  8/15/23         LEFI     Date assessed:  23         LEFI  82% Disability        10/30/23         LEFI  72% disability          23         LEFI:  69% Disability         1/10/24      Number of Comorbidities:  [x]0     []1-2    []3+    Latex Allergy:  [x]NO      []YES  Preferred Language for Healthcare:   [x]English       []other:      Pain level:  0-1/10     SUBJECTIVE:  Pt reports her walking is feeling better and better, feels like she has gotten over pretty much all of the inflammation from the surgery at this point     OBJECTIVE: See eval  Observation:   Test measurements:  Knee Ext to 0; Flex to 85 (heel slide)           Knee ext 0°;

## 2024-01-17 NOTE — TELEPHONE ENCOUNTER
OV NOTE FROM 1/8 INCOMPLETE.    PATIENT WANTS TO RTW WITHOUT RESTRICTIONS STARTING 1/22.    ONCE THIS IS CONFIRMED I WILL SEND TO PATIENT VIA [employer] MESSAGE.

## 2024-01-18 ENCOUNTER — OFFICE VISIT (OUTPATIENT)
Dept: ORTHOPEDIC SURGERY | Age: 54
End: 2024-01-18

## 2024-01-18 VITALS — WEIGHT: 126 LBS | HEIGHT: 67 IN | RESPIRATION RATE: 14 BRPM | BODY MASS INDEX: 19.78 KG/M2

## 2024-01-18 DIAGNOSIS — I82.90 THROMBOSIS OF VEIN: Primary | ICD-10-CM

## 2024-01-18 SDOH — HEALTH STABILITY: PHYSICAL HEALTH: ON AVERAGE, HOW MANY DAYS PER WEEK DO YOU ENGAGE IN MODERATE TO STRENUOUS EXERCISE (LIKE A BRISK WALK)?: 0 DAYS

## 2024-01-18 SDOH — HEALTH STABILITY: PHYSICAL HEALTH: ON AVERAGE, HOW MANY MINUTES DO YOU ENGAGE IN EXERCISE AT THIS LEVEL?: 0 MIN

## 2024-01-18 NOTE — PROGRESS NOTES
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  It has been present for approximately 1+ year. It's size was stable until one week ago when it was noted to enlarge and become mildly tender.  It is  associated with pain on movement.  Feelings of stiffness and fullness are not perceived by the patient.  There is a history of  overuse(frequent use of crutches). There is no history of weakness, fever or chills. The patient has become concerned about the mass and the worsening associated symptoms and is seen for hand surgery evaluation. The pain assessment has been reviewed and is correct as stated..    The patient's social history, past medical history, family history, medications, allergies and review of systems, entered 1/18/24, have been reviewed, and dated and are recorded in the chart.    On physical examination the patient is Height: 170.2 cm (5' 7\") tall and weighs Weight - Scale: 57.2 kg (126 lb).  Respirations are 18 per minute.  The patient is well nourished, is oriented to time and place, demonstrates appropriate mood and affect.  She walks with a limp.  There is a 5mm mass present on the palmar aspect of the left hand.   It is rubbery, blue in color and nontender to touch.  It does not transilluminate.  The overlying skin is normal.  Range of motion of the fingers, thumbs and wrists is full, bilaterally with no pain.  Distal circulation and sensation are intact in both upper extremities.  Gross muscle strength is normal bilaterally. Deep tendon reflexes are present and symmetrical.  Hand and wrist joints are stable. There are no enlarged epitrochlear lymph nodes.    I have personally reviewed and interpreted all previous external imaging studies, laboratory tests(CBC,HbA1c, Lipids, TSH) , diagnostic procedures and medical encounters pertinent to this patient's visit today.    Xrays: AP,

## 2024-01-19 ENCOUNTER — APPOINTMENT (OUTPATIENT)
Dept: PHYSICAL THERAPY | Age: 54
End: 2024-01-19
Payer: COMMERCIAL

## 2024-01-22 ENCOUNTER — OFFICE VISIT (OUTPATIENT)
Dept: ORTHOPEDIC SURGERY | Age: 54
End: 2024-01-22

## 2024-01-22 ENCOUNTER — APPOINTMENT (OUTPATIENT)
Dept: PHYSICAL THERAPY | Age: 54
End: 2024-01-22
Payer: COMMERCIAL

## 2024-01-22 ENCOUNTER — HOSPITAL ENCOUNTER (OUTPATIENT)
Dept: PHYSICAL THERAPY | Age: 54
Setting detail: THERAPIES SERIES
Discharge: HOME | End: 2024-01-22
Payer: COMMERCIAL

## 2024-01-22 VITALS — BODY MASS INDEX: 19.78 KG/M2 | HEIGHT: 67 IN | WEIGHT: 126 LBS

## 2024-01-22 DIAGNOSIS — S83.511D RUPTURE OF ANTERIOR CRUCIATE LIGAMENT OF RIGHT KNEE, SUBSEQUENT ENCOUNTER: Primary | ICD-10-CM

## 2024-01-22 PROCEDURE — 99024 POSTOP FOLLOW-UP VISIT: CPT | Performed by: ORTHOPAEDIC SURGERY

## 2024-01-22 PROCEDURE — 97110 THERAPEUTIC EXERCISES: CPT

## 2024-01-22 PROCEDURE — 97140 MANUAL THERAPY 1/> REGIONS: CPT

## 2024-01-22 NOTE — PROGRESS NOTES
1/22/2024     Reason for visit:  Status post right knee arthroscopy with revision ACL reconstruction using BTB autograft with lateral extra-articular tenodesis on 10/27/23    History of Present Illness:  The patient was in for postoperative evaluation.  Overall she is doing well and feels that she is improving.  She denies any fever or chills.  No numbness or tingling.  She has been compliant with physical therapy. She returned to work today.     Objective:  Ht 1.702 m (5' 7\")   Wt 57.2 kg (126 lb)   BMI 19.73 kg/m²      Physical Exam:  The patient is well-appearing and in no apparent distress  Examination of the right knee   There is a small effusion, no gross deformity or skin changes  Range of motion reveals 2 to 125  Grade 1A lachman, negative posterior drawer, no pain or laxity with varus or valgus stress at 0 degrees and 30 degrees of flexion  no joint line tenderness  5 out of 5 strength throughout distal muscle groups  Sensation is intact to light touch throughout all distributions  There is no calf swelling or tenderness  Palpable DP pulse, brisk cap refill, 2+ symmetric reflexes     Imaging:  AP lateral x-ray of the right knee was obtained the office today on 11/2023 and reviewed.  Postop changes consistent with ACL reconstruction.  There is no fracture or dislocation.    Assessment:  Status post right knee arthroscopy with revision ACL reconstruction using BTB autograft with lateral extra-articular tenodesis on 10/27/23    Plan:  The patient is doing well.  She will continue with physical therapy and follow-up in 6 weeks for repeat evaluation.                    Jones Nunez MD            Orthopaedic Surgery Sports Medicine and Arthroscopy            [facility]            Team Physician [facility] ([place])      Disclaimer:  This note was dictated with voice recognition software.  Though review and correction are routine, we apologize for any

## 2024-01-22 NOTE — FLOWSHEET NOTE
improvements in LE, proximal hip, and core control with self care, mobility, lifting, ambulation.  [] (19802) Provided verbal/tactile cueing for activities related to improving balance, coordination, kinesthetic sense, posture, motor skill, proprioception  to assist with LE, proximal hip, and core control in self care, mobility, lifting, ambulation and eccentric single leg control.     NMR and Therapeutic Activities:    [x] (49995 or 90752) Provided verbal/tactile cueing for activities related to improving balance, coordination, kinesthetic sense, posture, motor skill, proprioception and motor activation to allow for proper function of core, proximal hip and LE with self care and ADLs  [] (26715) Gait Re-education- Provided training and instruction to the patient for proper LE, core and proximal hip recruitment and positioning and eccentric body weight control with ambulation re-education including up and down stairs     Home Exercise Program:    [x] (97439) Reviewed/Progressed HEP activities related to strengthening, flexibility, endurance, ROM of core, proximal hip and LE for functional self-care, mobility, lifting and ambulation/stair navigation   [] (43257)Reviewed/Progressed HEP activities related to improving balance, coordination, kinesthetic sense, posture, motor skill, proprioception of core, proximal hip and LE for self care, mobility, lifting, and ambulation/stair navigation      Manual Treatments:  PROM / STM / Oscillations-Mobs:  G-I, II, III, IV (PA's, Inf., Post.)  [] (33988) Provided manual therapy to mobilize LE, proximal hip and/or LS spine soft tissue/joints for the purpose of modulating pain, promoting relaxation,  increasing ROM, reducing/eliminating soft tissue swelling/inflammation/restriction, improving soft tissue extensibility and allowing for proper ROM for normal function with self care, mobility, lifting and ambulation.     Charges  Timed Code Treatment Minutes: 45   Total Treatment

## 2024-01-22 NOTE — PROGRESS NOTES
1/8/2024     Reason for visit:  Status post right knee arthroscopy with revision ACL reconstruction using BTB autograft with lateral extra-articular tenodesis on 10/27/23    History of Present Illness:  The patient was in for postoperative evaluation.  Overall she well.   No fever or chill.  No numbness or tingling.  She has started physical therapy.    Objective:  Ht 1.702 m (5' 7\")   Wt 57.2 kg (126 lb)   BMI 19.73 kg/m²      Physical Exam:  The patient is well-appearing and in no apparent distress  Examination of the right knee   There is a small effusion, no gross deformity or skin changes  Range of motion reveals 5 to 125  Grade 1A lachman, negative posterior drawer, no pain or laxity with varus or valgus stress at 0 degrees and 30 degrees of flexion  no joint line tenderness  5 out of 5 strength throughout distal muscle groups  Sensation is intact to light touch throughout all distributions  There is no calf swelling or tenderness  Palpable DP pulse, brisk cap refill, 2+ symmetric reflexes     Imaging:  AP lateral x-ray of the right knee was obtained the office today on 11/2023 and reviewed.  Postop changes consistent with ACL reconstruction.  There is no fracture or dislocation.    Assessment:  Status post right knee arthroscopy with revision ACL reconstruction using BTB autograft with lateral extra-articular tenodesis on 10/27/23    Plan:  The patient is doing well.  We will start to discontinue the brace and crutches.  She will return in 2 weeks reevaluation.  Continue with therapy.                   Jones Nunez MD            Orthopaedic Surgery Sports Medicine and Arthroscopy            [facility]            Team Physician [facility] ([place])      Disclaimer:  This note was dictated with voice recognition software.  Though review and correction are routine, we apologize for any errors.

## 2024-01-23 ENCOUNTER — TELEPHONE (OUTPATIENT)
Dept: ORTHOPEDIC SURGERY | Age: 54
End: 2024-01-23

## 2024-01-23 NOTE — TELEPHONE ENCOUNTER
Faxed completed fmla (for intermittent time for appts) to Carmen Paulino @ [phone]    Also emailed a copy to the patient @ [email or web address]

## 2024-01-24 ENCOUNTER — TELEPHONE (OUTPATIENT)
Dept: ORTHOPEDIC SURGERY | Age: 54
End: 2024-01-24

## 2024-01-24 ENCOUNTER — HOSPITAL ENCOUNTER (OUTPATIENT)
Dept: PHYSICAL THERAPY | Age: 54
Setting detail: THERAPIES SERIES
Discharge: HOME | End: 2024-01-24
Payer: COMMERCIAL

## 2024-01-24 ENCOUNTER — APPOINTMENT (OUTPATIENT)
Dept: PHYSICAL THERAPY | Age: 54
End: 2024-01-24
Payer: COMMERCIAL

## 2024-01-24 PROCEDURE — 97112 NEUROMUSCULAR REEDUCATION: CPT

## 2024-01-24 PROCEDURE — 97110 THERAPEUTIC EXERCISES: CPT

## 2024-01-24 NOTE — TELEPHONE ENCOUNTER
Faxed revised fmla (for intermittent time for appts) to Carmen Paulino @ [phone]     Also emailed a copy to the patient @ [email or web address]

## 2024-01-24 NOTE — FLOWSHEET NOTE
11/22/23           Knee ext to lacking 3 w/o OP ; to 0 with slight OP; Knee flex to 125 w/o OP    RESTRICTIONS/PRECAUTIONS: HIGH ESTIMATE PATIENT previous ACLr 11/2022  DOS: 10/27/23    Exercises/Interventions:     Therapeutic Ex (18374)/NMR re-education (41038) Sets/sec Notes/CUES   Bike 5' Calf Raise 2x12 Calf Stretch 2x20\" SLR 30x 15x 15 x15  2#; W/ BFR   4x10x3\" 2#; W/ BFR   Hamstring Bridge on SB 2x10x3\"    Leg press  2x10  45#   JESSICA abd  JESSICA TKE 3x10 R/L  20x5\" 55#  55#                      Manual Intervention (63346)                 HEP instruction: verbally discussed, will formally assess next visit  Smart Cuff Size:  LOP:  PTP (60-80% of LOP):   Exercise 10 rep Max Repetition Weight Repetitions   SLR  2# 30, 15, 15, 15   LAQ  2# 15, 15, 15, 15   BFR protocol with Reps of 30/15/15/15 with 30-60 seconds rest in between sets and cuff depressed between exercises. Cuff pressure set at 60-80% of LOP measured with doppler ultrasound at posterior tibial pulse and/or dorsalis pedis pulse.     Patient was fully educated on Blood Flow Restriction (BFR) protocol this visit; this included how to properly don/doff Smart Cuff as well as how to properly inflate Smart Cuff.  They were educated about what symptoms to monitor for while performing BFR and were instructed to immediately stop BFR and release pressure if they experience any numbness/tingling in extremity, intense pain beneath cuff, loss of sensation in extremity or feeling of coldness in extremity. The patient has been medically cleared by MD for initiation of BFR therapy and verbal consent was obtained from patient / - patient's guardian prior to initiation of BFR therapy.  Therapeutic Exercise and NMR EXR  [x] (91124) Provided verbal/tactile cueing for activities related to strengthening, flexibility, endurance, ROM for improvements in LE, proximal hip, and core control with self care, mobility, lifting, ambulation.  [] (69299) Provided verbal/tactile cueing

## 2024-01-26 ENCOUNTER — APPOINTMENT (OUTPATIENT)
Dept: PHYSICAL THERAPY | Age: 54
End: 2024-01-26
Payer: COMMERCIAL

## 2024-01-26 ENCOUNTER — HOSPITAL ENCOUNTER (OUTPATIENT)
Dept: PHYSICAL THERAPY | Age: 54
Setting detail: THERAPIES SERIES
Discharge: HOME | End: 2024-01-26
Payer: COMMERCIAL

## 2024-01-26 PROCEDURE — 97140 MANUAL THERAPY 1/> REGIONS: CPT

## 2024-01-26 PROCEDURE — 97112 NEUROMUSCULAR REEDUCATION: CPT

## 2024-01-26 PROCEDURE — 97110 THERAPEUTIC EXERCISES: CPT

## 2024-01-26 NOTE — FLOWSHEET NOTE
The [facility] Orthopaedic and Sports Medicine [place],  Sports Performance and Rehabilitation, [address], [place] [address]  Phone: [phone]  Fax: [phone]      Physical Therapy Treatment Note:           Date:  2024    Patient Name:  Eufemia Joshi    :  1970  MRN: [id number]  Restrictions/Precautions:    Medical/Treatment Diagnosis Information:  Old complete tear of anterior cruciate ligament of right knee [M23.51]; Right knee pain, unspecified chronicity [M25.561]         Right knee pain [M25.561]                                        Insurance information: BCBS, 50 hard max; $25  Physician Information:  Jones Nunez MD  Has the plan of care been signed (Y/N):        [x]  Yes  []  No     Date of Patient follow up with Physician: 24      Is this a Progress Report:     []  Yes  [x]  No        If Yes:  Date Range for reporting period:  Beginning 1/10/24  Ending     Progress report will be due (10 Rx or 30 days whichever is less): 2/10/24        Visit # Insurance Allowable Auth Required   In-person 2024 (6 previously used) 50 []  Yes [x]  No        Functional Scale: LEFI     Date assessed:  7/10/23           LEFI     Date assessed:  8/15/23         LEFI     Date assessed:  23         LEFI  82% Disability        10/30/23         LEFI  72% disability          23         LEFI:  69% Disability         1/10/24      Number of Comorbidities:  [x]0     []1-2    []3+    Latex Allergy:  [x]NO      []YES  Preferred Language for Healthcare:   [x]English       []other:      Pain level:  0-1/10     SUBJECTIVE: Pt states that her knee has been more sore the past few days, and she has noticed some swelling on the outside part of her knee.     OBJECTIVE: See eval  Observation:   Test measurements:  Knee Ext to 0; Flex to 85 (heel slide)           Knee ext 0°; Flex 105 (Heel slide) =

## 2024-01-29 ENCOUNTER — HOSPITAL ENCOUNTER (OUTPATIENT)
Dept: PHYSICAL THERAPY | Age: 54
Setting detail: THERAPIES SERIES
Discharge: HOME | End: 2024-01-29
Payer: COMMERCIAL

## 2024-01-29 PROCEDURE — 97112 NEUROMUSCULAR REEDUCATION: CPT

## 2024-01-29 PROCEDURE — 97110 THERAPEUTIC EXERCISES: CPT

## 2024-01-29 NOTE — FLOWSHEET NOTE
self care, mobility, lifting, ambulation.  [] (16929) Provided verbal/tactile cueing for activities related to improving balance, coordination, kinesthetic sense, posture, motor skill, proprioception  to assist with LE, proximal hip, and core control in self care, mobility, lifting, ambulation and eccentric single leg control.     NMR and Therapeutic Activities:    [x] (83800 or 32684) Provided verbal/tactile cueing for activities related to improving balance, coordination, kinesthetic sense, posture, motor skill, proprioception and motor activation to allow for proper function of core, proximal hip and LE with self care and ADLs  [] (61443) Gait Re-education- Provided training and instruction to the patient for proper LE, core and proximal hip recruitment and positioning and eccentric body weight control with ambulation re-education including up and down stairs     Home Exercise Program:    [x] (79940) Reviewed/Progressed HEP activities related to strengthening, flexibility, endurance, ROM of core, proximal hip and LE for functional self-care, mobility, lifting and ambulation/stair navigation   [] (58814)Reviewed/Progressed HEP activities related to improving balance, coordination, kinesthetic sense, posture, motor skill, proprioception of core, proximal hip and LE for self care, mobility, lifting, and ambulation/stair navigation      Manual Treatments:  PROM / STM / Oscillations-Mobs:  G-I, II, III, IV (PA's, Inf., Post.)  [] (31328) Provided manual therapy to mobilize LE, proximal hip and/or LS spine soft tissue/joints for the purpose of modulating pain, promoting relaxation,  increasing ROM, reducing/eliminating soft tissue swelling/inflammation/restriction, improving soft tissue extensibility and allowing for proper ROM for normal function with self care, mobility, lifting and ambulation.     Charges  Timed Code Treatment Minutes: 40   Total Treatment Minutes: 40       [x] TE(89476) x 2     [] IONTO  [x] NMR

## 2024-01-31 ENCOUNTER — HOSPITAL ENCOUNTER (OUTPATIENT)
Dept: PHYSICAL THERAPY | Age: 54
Setting detail: THERAPIES SERIES
Discharge: HOME | End: 2024-01-31
Payer: COMMERCIAL

## 2024-01-31 PROCEDURE — 97112 NEUROMUSCULAR REEDUCATION: CPT

## 2024-01-31 PROCEDURE — 97110 THERAPEUTIC EXERCISES: CPT

## 2024-01-31 NOTE — FLOWSHEET NOTE
The [facility] Orthopaedic and Sports Medicine [place],  Sports Performance and Rehabilitation, [address], [place] [address]  Phone: [phone]  Fax: [phone]      Physical Therapy Treatment Note:           Date:  2024    Patient Name:  Eufemia Joshi    :  1970  MRN: [id number]  Restrictions/Precautions:    Medical/Treatment Diagnosis Information:  Old complete tear of anterior cruciate ligament of right knee [M23.51]; Right knee pain, unspecified chronicity [M25.561]         Right knee pain [M25.561]                                        Insurance information: BCBS, 50 hard max; $25  Physician Information:  Jones Nunez MD  Has the plan of care been signed (Y/N):        [x]  Yes  []  No     Date of Patient follow up with Physician: 24      Is this a Progress Report:     []  Yes  [x]  No        If Yes:  Date Range for reporting period:  Beginning 1/10/24  Ending     Progress report will be due (10 Rx or 30 days whichever is less): 2/10/24        Visit # Insurance Allowable Auth Required   In-person 2024 (6 previously used) 50 []  Yes [x]  No        Functional Scale: LEFI     Date assessed:  7/10/23           LEFI     Date assessed:  8/15/23         LEFI     Date assessed:  23         LEFI  82% Disability        10/30/23         LEFI  72% disability          23         LEFI:  69% Disability         1/10/24      Number of Comorbidities:  [x]0     []1-2    []3+    Latex Allergy:  [x]NO      []YES  Preferred Language for Healthcare:   [x]English       []other:      Pain level:  0-1/10     SUBJECTIVE:  Pt states her Knee has been feeling good recently. She reports continued use of her knee sleeve, and she repoirts the swelling in her R knee has been minimal.     OBJECTIVE: See eval  Observation:   Test measurements:  Knee Ext to 0; Flex to 85 (heel slide)           Knee

## 2024-02-05 ENCOUNTER — HOSPITAL ENCOUNTER (OUTPATIENT)
Dept: PHYSICAL THERAPY | Age: 54
Setting detail: THERAPIES SERIES
Discharge: HOME | End: 2024-02-05
Payer: COMMERCIAL

## 2024-02-05 PROCEDURE — 97110 THERAPEUTIC EXERCISES: CPT

## 2024-02-05 NOTE — FLOWSHEET NOTE
The [facility] Orthopaedic and Sports Medicine [place],  Sports Performance and Rehabilitation, [address] [address]  Phone: [phone]  Fax: [phone]      Physical Therapy Treatment Note:           Date:  2024    Patient Name:  Eufemia Joshi    :  1970  MRN: [id number]  Restrictions/Precautions:    Medical/Treatment Diagnosis Information:  Old complete tear of anterior cruciate ligament of right knee [M23.51]; Right knee pain, unspecified chronicity [M25.561]         Right knee pain [M25.561]                                        Insurance information: BCBS, 50 hard max; $25  Physician Information:  Jones Nunez MD  Has the plan of care been signed (Y/N):        [x]  Yes  []  No     Date of Patient follow up with Physician: 24      Is this a Progress Report:     []  Yes  [x]  No        If Yes:  Date Range for reporting period:  Beginning 1/10/24  Ending     Progress report will be due (10 Rx or 30 days whichever is less): 2/10/24        Visit # Insurance Allowable Auth Required   In-person 2024 (6 previously used) 50 []  Yes [x]  No        Functional Scale: LEFI     Date assessed:  7/10/23           LEFI     Date assessed:  8/15/23         LEFI     Date assessed:  23         LEFI  82% Disability        10/30/23         LEFI  72% disability          23         LEFI:  69% Disability         1/10/24      Number of Comorbidities:  [x]0     []1-2    []3+    Latex Allergy:  [x]NO      []YES  Preferred Language for Healthcare:   [x]English       []other:      Pain level:  0-1/10     SUBJECTIVE:  Pt states her knee feels very good today, and reports that work has been going much better so far this week.   OBJECTIVE: See eval  Observation:   Test measurements:  Knee Ext to 0; Flex to 85 (heel slide)           Knee ext 0°; Flex 105 (Heel slide) = 23

## 2024-02-07 ENCOUNTER — HOSPITAL ENCOUNTER (OUTPATIENT)
Dept: PHYSICAL THERAPY | Age: 54
Setting detail: THERAPIES SERIES
Discharge: HOME | End: 2024-02-07
Payer: COMMERCIAL

## 2024-02-07 PROCEDURE — 97112 NEUROMUSCULAR REEDUCATION: CPT

## 2024-02-07 PROCEDURE — 97110 THERAPEUTIC EXERCISES: CPT

## 2024-02-07 NOTE — FLOWSHEET NOTE
The [facility] Orthopaedic and Sports Medicine [place],  Sports Performance and Rehabilitation, [address] [address]  Phone: [phone]  Fax: [phone]      Physical Therapy Treatment Note:           Date:  2024    Patient Name:  Eufemia Joshi    :  1970  MRN: [id number]  Restrictions/Precautions:    Medical/Treatment Diagnosis Information:  Old complete tear of anterior cruciate ligament of right knee [M23.51]; Right knee pain, unspecified chronicity [M25.561]         Right knee pain [M25.561]                                        Insurance information: BCBS, 50 hard max; $25  Physician Information:  Jones Nunez MD  Has the plan of care been signed (Y/N):        [x]  Yes  []  No     Date of Patient follow up with Physician: 24      Is this a Progress Report:     []  Yes  [x]  No        If Yes:  Date Range for reporting period:  Beginning 1/10/24  Ending     Progress report will be due (10 Rx or 30 days whichever is less): 2/10/24        Visit # Insurance Allowable Auth Required   In-person 2024 (6 previously used) 50 []  Yes [x]  No        Functional Scale: LEFI     Date assessed:  7/10/23           LEFI     Date assessed:  8/15/23         LEFI     Date assessed:  23         LEFI  82% Disability        10/30/23         LEFI  72% disability          23         LEFI:  69% Disability         1/10/24      Number of Comorbidities:  [x]0     []1-2    []3+    Latex Allergy:  [x]NO      []YES  Preferred Language for Healthcare:   [x]English       []other:      Pain level:  0-1/10     SUBJECTIVE:  Pt states she is feeling good today, and states that her knee has continued to feel good at work and with walking.   OBJECTIVE: See eval  Observation:   Test measurements:  Knee Ext to 0; Flex to 85 (heel slide)           Knee ext 0°; Flex 105 (Heel slide) = 23        20-Jun-2017 10:21

## 2024-02-09 ENCOUNTER — HOSPITAL ENCOUNTER (OUTPATIENT)
Dept: PHYSICAL THERAPY | Age: 54
Setting detail: THERAPIES SERIES
Discharge: HOME | End: 2024-02-09
Payer: COMMERCIAL

## 2024-02-09 PROCEDURE — 97110 THERAPEUTIC EXERCISES: CPT

## 2024-02-09 PROCEDURE — 97112 NEUROMUSCULAR REEDUCATION: CPT

## 2024-02-09 NOTE — FLOWSHEET NOTE
ext to lacking 3 w/o OP ; to 0 with slight OP; Knee flex to 125 w/o OP    RESTRICTIONS/PRECAUTIONS: HIGH ESTIMATE PATIENT previous ACLr 11/2022  DOS: 10/27/23    Exercises/Interventions:     Therapeutic Ex (79848)/NMR re-education (59917) Sets/sec Notes/CUES   Bike 5' Calf Raise 3x10 Calf Stretch 3x30\" 30x 15x 15 x15  2#; W/ BFR   4x10x3\" 2#; W/ BFR   3x10x3\"    CC resisted walking X10 fwd/bck 30# (up NV)   15x, 15x, 15x, 15x W/ BFR   JESSICA abd  2x10 R/L  20x5\" 55#, #1 on height   55#   3x10 45#   Step ups/ step downs  2x10 R/L 6 inch step, 4 inch step    CC TKE  3x10  30#   Stair ambulation 3x 1 flight               Manual Intervention (88761)                 HEP instruction: verbally discussed, will formally assess next visit  Smart Cuff Size:  LOP:  PTP (60-80% of LOP):   Exercise 10 rep Max Repetition Weight Repetitions   SLR  2# 30, 15, 15, 15    15, 15, 15, 15   Quad sets  15, 15, 15, 15   BFR protocol with Reps of 30/15/15/15 with 30-60 seconds rest in between sets and cuff depressed between exercises. Cuff pressure set at 60-80% of LOP measured with doppler ultrasound at posterior tibial pulse and/or dorsalis pedis pulse.     Patient was fully educated on Blood Flow Restriction (BFR) protocol this visit; this included how to properly don/doff Smart Cuff as well as how to properly inflate Smart Cuff.  They were educated about what symptoms to monitor for while performing BFR and were instructed to immediately stop BFR and release pressure if they experience any numbness/tingling in extremity, intense pain beneath cuff, loss of sensation in extremity or feeling of coldness in extremity. The patient has been medically cleared by MD for initiation of BFR therapy and verbal consent was obtained from patient / - patient's guardian prior to initiation of BFR therapy.  Therapeutic Exercise and NMR EXR  [x] (89817) Provided verbal/tactile cueing for activities related to strengthening, flexibility, endurance, ROM for

## 2024-02-12 ENCOUNTER — HOSPITAL ENCOUNTER (OUTPATIENT)
Dept: PHYSICAL THERAPY | Age: 54
Setting detail: THERAPIES SERIES
Discharge: HOME | End: 2024-02-12
Payer: COMMERCIAL

## 2024-02-12 PROCEDURE — 97110 THERAPEUTIC EXERCISES: CPT

## 2024-02-12 NOTE — FLOWSHEET NOTE
The [facility] Orthopaedic and Sports Medicine [place],  Sports Performance and Rehabilitation, [address], [place] [address]  Phone: [phone]  Fax: [phone]      Physical Therapy Treatment Note:           Date:  2024    Patient Name:  Eufemia Joshi    :  1970  MRN: [id number]  Restrictions/Precautions:    Medical/Treatment Diagnosis Information:  Old complete tear of anterior cruciate ligament of right knee [M23.51]; Right knee pain, unspecified chronicity [M25.561]         Right knee pain [M25.561]                                        Insurance information: BCBS, 50 hard max; $25  Physician Information:  Jones Nunez MD  Has the plan of care been signed (Y/N):        [x]  Yes  []  No     Date of Patient follow up with Physician: 24      Is this a Progress Report:     []  Yes  [x]  No        If Yes:  Date Range for reporting period:  Beginning 1/10/24  Ending     Progress report will be due (10 Rx or 30 days whichever is less): 2/10/24        Visit # Insurance Allowable Auth Required   In-person 15 2024    45 2023 (6 previously used) 50 []  Yes [x]  No        Functional Scale: LEFI     Date assessed:  7/10/23           LEFI     Date assessed:  8/15/23         LEFI     Date assessed:  23         LEFI  82% Disability        10/30/23         LEFI  72% disability          23         LEFI:  69% Disability         1/10/24      Number of Comorbidities:  [x]0     []1-2    []3+    Latex Allergy:  [x]NO      []YES  Preferred Language for Healthcare:   [x]English       []other:      Pain level:  0-1/10     SUBJECTIVE: Pt states that she feels tired today, and she feels like she is experiencing more crepitus today. She reports she was in the hospital for a family member over the weekend, and was walking a lot.     OBJECTIVE: See eval  Observation:   Test measurements:  Knee Ext to 0;

## 2024-02-14 ENCOUNTER — APPOINTMENT (OUTPATIENT)
Dept: PHYSICAL THERAPY | Age: 54
End: 2024-02-14
Payer: COMMERCIAL

## 2024-02-16 ENCOUNTER — APPOINTMENT (OUTPATIENT)
Dept: PHYSICAL THERAPY | Age: 54
End: 2024-02-16
Payer: COMMERCIAL

## 2024-02-19 ENCOUNTER — HOSPITAL ENCOUNTER (OUTPATIENT)
Dept: PHYSICAL THERAPY | Age: 54
Setting detail: THERAPIES SERIES
Discharge: HOME | End: 2024-02-19
Payer: COMMERCIAL

## 2024-02-19 PROCEDURE — 97110 THERAPEUTIC EXERCISES: CPT

## 2024-02-19 NOTE — FLOWSHEET NOTE
The [facility] Orthopaedic and Sports Medicine [place],  Sports Performance and Rehabilitation, [address] [address]  Phone: [phone]  Fax: [phone]      Physical Therapy Treatment Note:           Date:  2024    Patient Name:  Eufemia Joshi    :  1970  MRN: [id number]  Restrictions/Precautions:    Medical/Treatment Diagnosis Information:  Old complete tear of anterior cruciate ligament of right knee [M23.51]; Right knee pain, unspecified chronicity [M25.561]         Right knee pain [M25.561]                                        Insurance information: BCBS, 50 hard max; $25  Physician Information:  Jones Nunez MD  Has the plan of care been signed (Y/N):        [x]  Yes  []  No     Date of Patient follow up with Physician: 24      Is this a Progress Report:     []  Yes  [x]  No        If Yes:  Date Range for reporting period:  Beginning 1/10/24  Ending     Progress report will be due (10 Rx or 30 days whichever is less): 2/10/24        Visit # Insurance Allowable Auth Required   In-person 2024 (6 previously used) 50 []  Yes [x]  No        Functional Scale: LEFI     Date assessed:  7/10/23           LEFI     Date assessed:  8/15/23         LEFI     Date assessed:  23         LEFI  82% Disability        10/30/23         LEFI  72% disability          23         LEFI:  69% Disability         1/10/24      Number of Comorbidities:  [x]0     []1-2    []3+    Latex Allergy:  [x]NO      []YES  Preferred Language for Healthcare:   [x]English       []other:      Pain level:  0-1/10     SUBJECTIVE: Pt states she feels much better today, and has noticed she has been walking normally over the past 3 days and states she feels she is walking how she did before her surgeries.   OBJECTIVE: See eval  Observation: Pt ambulates with even WB on BLE and has equal time in stance

## 2024-02-21 ENCOUNTER — APPOINTMENT (OUTPATIENT)
Dept: PHYSICAL THERAPY | Age: 54
End: 2024-02-21
Payer: COMMERCIAL

## 2024-02-23 ENCOUNTER — HOSPITAL ENCOUNTER (OUTPATIENT)
Dept: PHYSICAL THERAPY | Age: 54
Setting detail: THERAPIES SERIES
Discharge: HOME | End: 2024-02-23
Payer: COMMERCIAL

## 2024-02-23 PROCEDURE — 97110 THERAPEUTIC EXERCISES: CPT

## 2024-02-23 NOTE — FLOWSHEET NOTE
improvements in LE, proximal hip, and core control with self care, mobility, lifting, ambulation.  [] (59557) Provided verbal/tactile cueing for activities related to improving balance, coordination, kinesthetic sense, posture, motor skill, proprioception  to assist with LE, proximal hip, and core control in self care, mobility, lifting, ambulation and eccentric single leg control.     NMR and Therapeutic Activities:    [x] (39117 or 75505) Provided verbal/tactile cueing for activities related to improving balance, coordination, kinesthetic sense, posture, motor skill, proprioception and motor activation to allow for proper function of core, proximal hip and LE with self care and ADLs  [] (99971) Gait Re-education- Provided training and instruction to the patient for proper LE, core and proximal hip recruitment and positioning and eccentric body weight control with ambulation re-education including up and down stairs     Home Exercise Program:    [x] (34068) Reviewed/Progressed HEP activities related to strengthening, flexibility, endurance, ROM of core, proximal hip and LE for functional self-care, mobility, lifting and ambulation/stair navigation   [] (19324)Reviewed/Progressed HEP activities related to improving balance, coordination, kinesthetic sense, posture, motor skill, proprioception of core, proximal hip and LE for self care, mobility, lifting, and ambulation/stair navigation      Manual Treatments:  PROM / STM / Oscillations-Mobs:  G-I, II, III, IV (PA's, Inf., Post.)  [] (03843) Provided manual therapy to mobilize LE, proximal hip and/or LS spine soft tissue/joints for the purpose of modulating pain, promoting relaxation,  increasing ROM, reducing/eliminating soft tissue swelling/inflammation/restriction, improving soft tissue extensibility and allowing for proper ROM for normal function with self care, mobility, lifting and ambulation.     Charges  Timed Code Treatment Minutes: 40   Total Treatment

## 2024-02-26 ENCOUNTER — HOSPITAL ENCOUNTER (OUTPATIENT)
Dept: PHYSICAL THERAPY | Age: 54
Setting detail: THERAPIES SERIES
Discharge: HOME | End: 2024-02-26
Payer: COMMERCIAL

## 2024-02-26 PROCEDURE — 97110 THERAPEUTIC EXERCISES: CPT

## 2024-02-26 NOTE — THERAPY RECERTIFICATION
[facility] - [facility]opaedic and [facility] [place],  Sports Performance and Rehabilitation, [address]                  [address] [id number]  Phone: [phone]  Fax: [phone]        Physical Therapy Re-Certification Plan of Care    Dear Jones Nunez MD  ,    We had the pleasure of treating the following patient for physical therapy services at [facility]. A summary of our findings can be found in the updated assessment below.  This includes our plan of care.  If you have any questions or concerns regarding these findings, please do not hesitate to contact me at the office phone number checked above.  Thank you for the referral.     Physician Signature:________________________________Date:__________________  By signing above (or electronic signature), therapist's plan is approved by physician      Overall Response to Treatment:   [x]Patient is responding well to treatment and improvement is noted with regards to goals   []Patient should continue to improve in reasonable time if they continue HEP   []Patient has plateaued and is no longer responding to skilled PT intervention    []Patient is getting worse and would benefit from return to referring MD   []Patient unable to adhere to initial POC   [x]Other: Pt displays decreased strength and activation of R quad with stair ambulation and ambulation on flat surfaces. Patient has made significant improvements in her POC, but would still benefit from skilled PT to improve activation of R quad and improve confidence of patient with functional activities.     Total Visits: 66     Recommendation:    [x] Continue PT 2x / wk for 4-6 weeks.   [] Hold PT, pending MD visit   [] Discharge to [facility]. Follow up with PT or MD PRN.      Physical Therapy Treatment Note:           Date:  2024    Patient Name:  Eufemia Joshi    :  1970  MRN: [id number]  Restrictions/Precautions:

## 2024-02-28 ENCOUNTER — HOSPITAL ENCOUNTER (OUTPATIENT)
Dept: PHYSICAL THERAPY | Age: 54
Setting detail: THERAPIES SERIES
Discharge: HOME | End: 2024-02-28
Payer: COMMERCIAL

## 2024-02-28 PROCEDURE — 97110 THERAPEUTIC EXERCISES: CPT

## 2024-02-28 NOTE — FLOWSHEET NOTE
ROM for improvements in LE, proximal hip, and core control with self care, mobility, lifting, ambulation.  [] (28646) Provided verbal/tactile cueing for activities related to improving balance, coordination, kinesthetic sense, posture, motor skill, proprioception  to assist with LE, proximal hip, and core control in self care, mobility, lifting, ambulation and eccentric single leg control.     NMR and Therapeutic Activities:    [x] (18376 or 99278) Provided verbal/tactile cueing for activities related to improving balance, coordination, kinesthetic sense, posture, motor skill, proprioception and motor activation to allow for proper function of core, proximal hip and LE with self care and ADLs  [] (88631) Gait Re-education- Provided training and instruction to the patient for proper LE, core and proximal hip recruitment and positioning and eccentric body weight control with ambulation re-education including up and down stairs     Home Exercise Program:    [x] (34179) Reviewed/Progressed HEP activities related to strengthening, flexibility, endurance, ROM of core, proximal hip and LE for functional self-care, mobility, lifting and ambulation/stair navigation   [] (59496)Reviewed/Progressed HEP activities related to improving balance, coordination, kinesthetic sense, posture, motor skill, proprioception of core, proximal hip and LE for self care, mobility, lifting, and ambulation/stair navigation      Manual Treatments:  PROM / STM / Oscillations-Mobs:  G-I, II, III, IV (PA's, Inf., Post.)  [] (95403) Provided manual therapy to mobilize LE, proximal hip and/or LS spine soft tissue/joints for the purpose of modulating pain, promoting relaxation,  increasing ROM, reducing/eliminating soft tissue swelling/inflammation/restriction, improving soft tissue extensibility and allowing for proper ROM for normal function with self care, mobility, lifting and ambulation.     Charges  Timed Code Treatment Minutes: 40   Total

## 2024-03-01 ENCOUNTER — APPOINTMENT (OUTPATIENT)
Dept: PHYSICAL THERAPY | Age: 54
End: 2024-03-01
Payer: COMMERCIAL

## 2024-03-04 ENCOUNTER — HOSPITAL ENCOUNTER (OUTPATIENT)
Dept: PHYSICAL THERAPY | Age: 54
Setting detail: THERAPIES SERIES
Discharge: HOME | End: 2024-03-04
Payer: COMMERCIAL

## 2024-03-04 PROCEDURE — 97110 THERAPEUTIC EXERCISES: CPT

## 2024-03-04 PROCEDURE — 97112 NEUROMUSCULAR REEDUCATION: CPT

## 2024-03-04 NOTE — FLOWSHEET NOTE
Charges  Timed Code Treatment Minutes: 45   Total Treatment Minutes: 45       [x] TE(30533) x 2     [] IONTO  [x] NMR (73144) x 1    [] VASO  [] Manual (63960) x      [] Other:  Gait training   [] TA x      [] Mech Traction (93398)  [] ES(attended) (91005)      [] ES (un) (71817):     GOALS:  Patient stated goal: To get full use of her knee back.  [x] Progressing: [] Met: [] Not Met: [] Adjusted    Therapist goals for Patient:   Short Term Goals: To be achieved in: 2 weeks  1. Independent in HEP and progression per patient tolerance, in order to prevent re-injury.   [] Progressing: [x] Met: [] Not Met: [] Adjusted  2. Patient will have a decrease in pain to facilitate improvement in movement, function, and ADLs as indicated by Functional Deficits.  [] Progressing: [x] Met: [] Not Met: [] Adjusted    Long Term Goals: To be achieved in: 12 weeks  1. Disability index score of 40% or more per LEFS to assist with reaching prior level of function.   [x] Progressing: [] Met: [] Not Met: [] Adjusted  2. Patient will demonstrate increased AROM to 0-125 to allow for proper joint functioning as indicated by patients Functional Deficits.   [x] Progressing: [] Met: [] Not Met: [] Adjusted  3. Patient will demonstrate an increase in Strength to good proximal hip strength and control, within 20% as tested by biodex in LE to allow for proper functional mobility as indicated by patients Functional Deficits. [x] Progressing: [] Met: [] Not Met: [] Adjusted  4. Patient will return to all ADL's and work related functional activities without increased symptoms or restriction.   [] Progressing: [x] Met: [] Not Met: [] Adjusted    Overall Progression Towards Functional goals/ Treatment Progress Update:  [x] Patient is progressing as expected towards functional goals listed.    [] Progression is slowed due to complexities/Impairments listed.  [] Progression has been slowed due to co-morbidities.  [] Plan just implemented, too soon to

## 2024-03-06 ENCOUNTER — HOSPITAL ENCOUNTER (OUTPATIENT)
Dept: PHYSICAL THERAPY | Age: 54
Setting detail: THERAPIES SERIES
Discharge: HOME | End: 2024-03-06
Payer: COMMERCIAL

## 2024-03-06 PROCEDURE — 97112 NEUROMUSCULAR REEDUCATION: CPT

## 2024-03-06 PROCEDURE — 97110 THERAPEUTIC EXERCISES: CPT

## 2024-03-06 NOTE — FLOWSHEET NOTE
strengthening, flexibility, endurance, ROM for improvements in LE, proximal hip, and core control with self care, mobility, lifting, ambulation.  [] (10884) Provided verbal/tactile cueing for activities related to improving balance, coordination, kinesthetic sense, posture, motor skill, proprioception  to assist with LE, proximal hip, and core control in self care, mobility, lifting, ambulation and eccentric single leg control.     NMR and Therapeutic Activities:    [x] (78087 or 73221) Provided verbal/tactile cueing for activities related to improving balance, coordination, kinesthetic sense, posture, motor skill, proprioception and motor activation to allow for proper function of core, proximal hip and LE with self care and ADLs  [] (34958) Gait Re-education- Provided training and instruction to the patient for proper LE, core and proximal hip recruitment and positioning and eccentric body weight control with ambulation re-education including up and down stairs     Home Exercise Program:    [x] (71540) Reviewed/Progressed HEP activities related to strengthening, flexibility, endurance, ROM of core, proximal hip and LE for functional self-care, mobility, lifting and ambulation/stair navigation   [] (21652)Reviewed/Progressed HEP activities related to improving balance, coordination, kinesthetic sense, posture, motor skill, proprioception of core, proximal hip and LE for self care, mobility, lifting, and ambulation/stair navigation      Manual Treatments:  PROM / STM / Oscillations-Mobs:  G-I, II, III, IV (PA's, Inf., Post.)  [] (77313) Provided manual therapy to mobilize LE, proximal hip and/or LS spine soft tissue/joints for the purpose of modulating pain, promoting relaxation,  increasing ROM, reducing/eliminating soft tissue swelling/inflammation/restriction, improving soft tissue extensibility and allowing for proper ROM for normal function with self care, mobility, lifting and ambulation.     Charges  Timed

## 2024-03-08 ENCOUNTER — APPOINTMENT (OUTPATIENT)
Dept: PHYSICAL THERAPY | Age: 54
End: 2024-03-08
Payer: COMMERCIAL

## 2024-03-11 ENCOUNTER — OFFICE VISIT (OUTPATIENT)
Dept: ORTHOPEDIC SURGERY | Age: 54
End: 2024-03-11
Payer: COMMERCIAL

## 2024-03-11 ENCOUNTER — HOSPITAL ENCOUNTER (OUTPATIENT)
Dept: PHYSICAL THERAPY | Age: 54
Setting detail: THERAPIES SERIES
Discharge: HOME | End: 2024-03-11
Payer: COMMERCIAL

## 2024-03-11 VITALS — WEIGHT: 126 LBS | HEIGHT: 67 IN | BODY MASS INDEX: 19.78 KG/M2

## 2024-03-11 DIAGNOSIS — S83.511D RUPTURE OF ANTERIOR CRUCIATE LIGAMENT OF RIGHT KNEE, SUBSEQUENT ENCOUNTER: Primary | ICD-10-CM

## 2024-03-11 PROCEDURE — 99213 OFFICE O/P EST LOW 20 MIN: CPT | Performed by: ORTHOPAEDIC SURGERY

## 2024-03-11 PROCEDURE — 97530 THERAPEUTIC ACTIVITIES: CPT

## 2024-03-11 PROCEDURE — 97112 NEUROMUSCULAR REEDUCATION: CPT

## 2024-03-11 PROCEDURE — 97110 THERAPEUTIC EXERCISES: CPT

## 2024-03-11 NOTE — FLOWSHEET NOTE
strengthening, flexibility, endurance, ROM for improvements in LE, proximal hip, and core control with self care, mobility, lifting, ambulation.  [] (79080) Provided verbal/tactile cueing for activities related to improving balance, coordination, kinesthetic sense, posture, motor skill, proprioception  to assist with LE, proximal hip, and core control in self care, mobility, lifting, ambulation and eccentric single leg control.     NMR and Therapeutic Activities:    [x] (45011 or 74510) Provided verbal/tactile cueing for activities related to improving balance, coordination, kinesthetic sense, posture, motor skill, proprioception and motor activation to allow for proper function of core, proximal hip and LE with self care and ADLs  [] (75888) Gait Re-education- Provided training and instruction to the patient for proper LE, core and proximal hip recruitment and positioning and eccentric body weight control with ambulation re-education including up and down stairs     Home Exercise Program:    [x] (80664) Reviewed/Progressed HEP activities related to strengthening, flexibility, endurance, ROM of core, proximal hip and LE for functional self-care, mobility, lifting and ambulation/stair navigation   [] (30285)Reviewed/Progressed HEP activities related to improving balance, coordination, kinesthetic sense, posture, motor skill, proprioception of core, proximal hip and LE for self care, mobility, lifting, and ambulation/stair navigation      Manual Treatments:  PROM / STM / Oscillations-Mobs:  G-I, II, III, IV (PA's, Inf., Post.)  [] (29757) Provided manual therapy to mobilize LE, proximal hip and/or LS spine soft tissue/joints for the purpose of modulating pain, promoting relaxation,  increasing ROM, reducing/eliminating soft tissue swelling/inflammation/restriction, improving soft tissue extensibility and allowing for proper ROM for normal function with self care, mobility, lifting and ambulation.     Charges  Timed

## 2024-03-13 ENCOUNTER — HOSPITAL ENCOUNTER (OUTPATIENT)
Dept: PHYSICAL THERAPY | Age: 54
Setting detail: THERAPIES SERIES
Discharge: HOME | End: 2024-03-13
Payer: COMMERCIAL

## 2024-03-13 PROCEDURE — 97110 THERAPEUTIC EXERCISES: CPT

## 2024-03-13 NOTE — FLOWSHEET NOTE
to lack of progress  [] Patient is not progressing as expected and requires additional follow up with physician  [] Other    Prognosis for POC: [x] Good [] Fair  [] Poor      Patient requires continued skilled intervention: [x] Yes  [] No    Treatment/Activity Tolerance:  [x] Patient able to complete treatment  [] Patient limited by fatigue  [] Patient limited by pain    [] Patient limited by other medical complications  [] Other:     ASSESSMENT: Pt tolerated PT session well today. Pt completed lateral steps at CC, and standing hip extensions for the first time today. Patient demonstrated decreased speed and apprehension with lateral steps at CC. Patient also demonstrated adequate knee extension with ambulation and standing hip extension. Plan to continue to progress exercises per patient tolerance.   PLAN: See eval  [x] Continue per plan of care [] Alter current plan (see comments above)  [] Plan of care initiated [] Hold pending MD visit [] Discharge    Electronically signed by  Erin Duran PT, DPT, SCS; Darnell Faulkner, SPT    Therapist was present, directed the patient's care, made skilled judgement, and was responsible for assessment and treatment of the patient.    Note: If patient does not return for scheduled/ recommended follow up visits, this note will serve as a discharge from care along with most recent update on progress.

## 2024-03-15 ENCOUNTER — HOSPITAL ENCOUNTER (OUTPATIENT)
Dept: PHYSICAL THERAPY | Age: 54
Setting detail: THERAPIES SERIES
Discharge: HOME | End: 2024-03-15
Payer: COMMERCIAL

## 2024-03-15 PROCEDURE — 97110 THERAPEUTIC EXERCISES: CPT

## 2024-03-15 NOTE — FLOWSHEET NOTE
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 tear of anterior cruciate ligament of right knee [M23.51]; Right knee pain, unspecified chronicity [M25.561]         Right knee pain [M25.561]                                        Insurance information: [employer], 50 hard max; $25  Physician Information:  Jones Nunez MD  Has the plan of care been signed (Y/N):        [x]  Yes  []  No     Date of Patient follow up with Physician: 24      Is this a Progress Report:     []  Yes  [x]  No        If Yes:  Date Range for reporting period:  Beginning 24  Ending     Progress report will be due (10 Rx or 30 days whichever is less): 3/26/24        Visit # Insurance Allowable Auth Required   In-person 2024 (6 previously used) 50 []  Yes [x]  No        Functional Scale: LEFI     Date assessed:  7/10/23           LEFI     Date assessed:  8/15/23         LEFI     Date assessed:  23         LEFI  82% Disability        10/30/23         LEFI  72% disability          23         LEFI:  69% Disability         1/10/24      Number of Comorbidities:  [x]0     []1-2    []3+    Latex Allergy:  [x]NO      []YES  Preferred Language for Healthcare:   [x]English       []other:      Pain level:  0-1/10     SUBJECTIVE:  Pt states no new issues in her knee    OBJECTIVE:   Observation: Pt ambulates with even WB on BLE and has equal time in stance phases of R and L leg.   Test measurements:  Knee Ext to 0; Flex to 85 (heel slide)           Knee ext 0°; Flex 105 (Heel slide) = 23

## 2024-03-18 ENCOUNTER — HOSPITAL ENCOUNTER (OUTPATIENT)
Dept: PHYSICAL THERAPY | Age: 54
Setting detail: THERAPIES SERIES
Discharge: HOME | End: 2024-03-18
Payer: COMMERCIAL

## 2024-03-18 PROCEDURE — 97110 THERAPEUTIC EXERCISES: CPT

## 2024-03-18 NOTE — PROGRESS NOTES
3/11/2024     Reason for visit:  Status post right knee arthroscopy with revision ACL reconstruction using BTB autograft with lateral extra-articular tenodesis on 10/27/23    History of Present Illness:  The patient was in for postoperative evaluation.  Overall she is doing well and feels that she is improving.  She denies any fever or chills.  No numbness or tingling.  She has been compliant with physical therapy.     Objective:  Ht 1.702 m (5' 7\")   Wt 57.2 kg (126 lb)   BMI 19.73 kg/m²      Physical Exam:  The patient is well-appearing and in no apparent distress  Examination of the right knee   There is a small effusion, no gross deformity or skin changes  Range of motion reveals 0 to 125  Grade 1A lachman, negative posterior drawer, no pain or laxity with varus or valgus stress at 0 degrees and 30 degrees of flexion  no joint line tenderness  5 out of 5 strength throughout distal muscle groups  Sensation is intact to light touch throughout all distributions  There is no calf swelling or tenderness  Palpable DP pulse, brisk cap refill, 2+ symmetric reflexes     Imaging:  AP lateral x-ray of the right knee was obtained the office today on 11/2023 and reviewed.  Postop changes consistent with ACL reconstruction.  There is no fracture or dislocation.    Assessment:  Status post right knee arthroscopy with revision ACL reconstruction using BTB autograft with lateral extra-articular tenodesis on 10/27/23    Plan:  The patient is doing well.  She will return to see me 6 weeks after Biodex testing.    Greater than 20 minutes were spent with this encounter.  Time spent included evaluating the patient's chart prior to arrival.  Evaluating the patient in the office including history, physical examination, imaging reviewing, and counseling on next steps.  Lastly, time was spent discussing orders with my staff as well as providing documentation in the chart.                    Jones Nunez MD            Orthopaedic

## 2024-03-20 ENCOUNTER — APPOINTMENT (OUTPATIENT)
Dept: PHYSICAL THERAPY | Age: 54
End: 2024-03-20
Payer: COMMERCIAL

## 2024-03-22 ENCOUNTER — HOSPITAL ENCOUNTER (OUTPATIENT)
Dept: PHYSICAL THERAPY | Age: 54
Setting detail: THERAPIES SERIES
Discharge: HOME | End: 2024-03-22
Payer: COMMERCIAL

## 2024-03-22 PROCEDURE — 97110 THERAPEUTIC EXERCISES: CPT

## 2024-03-22 NOTE — FLOWSHEET NOTE
Short Term Goals: To be achieved in: 2 weeks  1. Independent in HEP and progression per patient tolerance, in order to prevent re-injury.   [] Progressing: [x] Met: [] Not Met: [] Adjusted  2. Patient will have a decrease in pain to facilitate improvement in movement, function, and ADLs as indicated by Functional Deficits.  [] Progressing: [x] Met: [] Not Met: [] Adjusted    Long Term Goals: To be achieved in: 12 weeks  1. Disability index score of 40% or more per LEFS to assist with reaching prior level of function.   [x] Progressing: [] Met: [] Not Met: [] Adjusted  2. Patient will demonstrate increased AROM to 0-125 to allow for proper joint functioning as indicated by patients Functional Deficits.   [x] Progressing: [] Met: [] Not Met: [] Adjusted  3. Patient will demonstrate an increase in Strength to good proximal hip strength and control, within 20% as tested by biodex in LE to allow for proper functional mobility as indicated by patients Functional Deficits. [x] Progressing: [] Met: [] Not Met: [] Adjusted  4. Patient will return to all ADL's and work related functional activities without increased symptoms or restriction.   [] Progressing: [x] Met: [] Not Met: [] Adjusted    Overall Progression Towards Functional goals/ Treatment Progress Update:  [x] Patient is progressing as expected towards functional goals listed.    [] Progression is slowed due to complexities/Impairments listed.  [] Progression has been slowed due to co-morbidities.  [] Plan just implemented, too soon to assess goals progression <30days   [] Goals require adjustment due to lack of progress  [] Patient is not progressing as expected and requires additional follow up with physician  [] Other    Prognosis for POC: [x] Good [] Fair  [] Poor      Patient requires continued skilled intervention: [x] Yes  [] No    Treatment/Activity Tolerance:  [x] Patient able to complete treatment  [] Patient limited by fatigue  [] Patient limited by

## 2024-03-25 ENCOUNTER — APPOINTMENT (OUTPATIENT)
Dept: PHYSICAL THERAPY | Age: 54
End: 2024-03-25
Payer: COMMERCIAL

## 2024-03-27 ENCOUNTER — HOSPITAL ENCOUNTER (OUTPATIENT)
Dept: PHYSICAL THERAPY | Age: 54
Setting detail: THERAPIES SERIES
Discharge: HOME | End: 2024-03-27
Payer: COMMERCIAL

## 2024-03-27 PROCEDURE — 97110 THERAPEUTIC EXERCISES: CPT

## 2024-03-29 ENCOUNTER — HOSPITAL ENCOUNTER (OUTPATIENT)
Dept: PHYSICAL THERAPY | Age: 54
Setting detail: THERAPIES SERIES
Discharge: HOME | End: 2024-03-29
Payer: COMMERCIAL

## 2024-03-29 PROCEDURE — 97110 THERAPEUTIC EXERCISES: CPT

## 2024-03-29 NOTE — FLOWSHEET NOTE
intervention: [x] Yes  [] No    Treatment/Activity Tolerance:  [x] Patient able to complete treatment  [] Patient limited by fatigue  [] Patient limited by pain    [] Patient limited by other medical complications  [] Other:     ASSESSMENT: Pt continues to demonstrated improvements in RT knee stability and strength. Eufemia completed standing tandem balance on airex pad with minimal sway with each foot forward. Pt also completed ambulation through series of 4 cones and demonstrated improved stability of R knee stabilizers with quicker turns. Pt also completed lateral step downs with decreased knee valgus. Pt would continue to benefit from skilled PT to further improve strength and stability of R knee.   PLAN: See eval  [x] Continue per plan of care [] Alter current plan (see comments above)  [] Plan of care initiated [] Hold pending MD visit [] Discharge    Electronically signed by  Erin Duran PT, DPT, SCS; Darnell Faulkner, SPT    Therapist was present, directed the patient's care, made skilled judgement, and was responsible for assessment and treatment of the patient.    Note: If patient does not return for scheduled/ recommended follow up visits, this note will serve as a discharge from care along with most recent update on progress.

## 2024-04-03 ENCOUNTER — HOSPITAL ENCOUNTER (OUTPATIENT)
Dept: PHYSICAL THERAPY | Age: 54
Setting detail: THERAPIES SERIES
Discharge: HOME | End: 2024-04-03
Payer: COMMERCIAL

## 2024-04-03 PROCEDURE — 97110 THERAPEUTIC EXERCISES: CPT

## 2024-04-03 NOTE — THERAPY RECERTIFICATION
facilitate improvement in movement, function, and ADLs as indicated by Functional Deficits.  [] Progressing: [x] Met: [] Not Met: [] Adjusted    Long Term Goals: To be achieved in: 12 weeks  1. Disability index score of 40% or more per LEFS to assist with reaching prior level of function.   [] Progressing: [x] Met: [] Not Met: [] Adjusted  2. Patient will demonstrate increased AROM to 0-125 to allow for proper joint functioning as indicated by patients Functional Deficits.   [] Progressing: [x] Met: [] Not Met: [] Adjusted  3. Patient will demonstrate an increase in Strength to good proximal hip strength and control, within 20% as tested by biodex in LE to allow for proper functional mobility as indicated by patients Functional Deficits. [x] Progressing: [] Met: [] Not Met: [] Adjusted  4. Patient will return to all ADL's and work related functional activities without increased symptoms or restriction.   [] Progressing: [x] Met: [] Not Met: [] Adjusted    Overall Progression Towards Functional goals/ Treatment Progress Update:  [x] Patient is progressing as expected towards functional goals listed.    [] Progression is slowed due to complexities/Impairments listed.  [] Progression has been slowed due to co-morbidities.  [] Plan just implemented, too soon to assess goals progression <30days   [] Goals require adjustment due to lack of progress  [] Patient is not progressing as expected and requires additional follow up with physician  [] Other    Prognosis for POC: [x] Good [] Fair  [] Poor      Patient requires continued skilled intervention: [x] Yes  [] No    Treatment/Activity Tolerance:  [x] Patient able to complete treatment  [] Patient limited by fatigue  [] Patient limited by pain    [] Patient limited by other medical complications  [] Other:     ASSESSMENT: See notes above    PLAN: See eval  [x] Continue per plan of care [] Alter current plan (see comments above)  [] Plan of care initiated [] Hold pending MD

## 2024-04-05 ENCOUNTER — OFFICE VISIT (OUTPATIENT)
Dept: ORTHOPEDIC SURGERY | Age: 54
End: 2024-04-05

## 2024-04-05 ENCOUNTER — HOSPITAL ENCOUNTER (OUTPATIENT)
Dept: PHYSICAL THERAPY | Age: 54
Setting detail: THERAPIES SERIES
Discharge: HOME | End: 2024-04-05
Payer: COMMERCIAL

## 2024-04-05 VITALS — WEIGHT: 126 LBS | BODY MASS INDEX: 19.78 KG/M2 | HEIGHT: 67 IN

## 2024-04-05 DIAGNOSIS — S92.352D CLOSED DISPLACED FRACTURE OF FIFTH METATARSAL BONE OF LEFT FOOT WITH ROUTINE HEALING, SUBSEQUENT ENCOUNTER: Primary | ICD-10-CM

## 2024-04-05 PROCEDURE — 97110 THERAPEUTIC EXERCISES: CPT

## 2024-04-05 NOTE — THERAPY RECERTIFICATION
[facility] Orthopaedic and Sports Medicine [place],  Sports Performance and Rehabilitation, [address] [address]  Phone: [phone]  Fax: [phone]    Physical Therapy Treatment Note:     Date:  2024    Patient Name:  Eufemia Joshi    :  1970  MRN: [id number]  Restrictions/Precautions:    Medical/Treatment Diagnosis Information:  Old complete tear of anterior cruciate ligament of right knee [M23.51]; Right knee pain, unspecified chronicity [M25.561]         Right knee pain [M25.561]                                        Insurance information: BCBS, 50 hard max; $25  Physician Information:  Jones Nunez MD  Has the plan of care been signed (Y/N):        [x]  Yes  []  No     Date of Patient follow up with Physician: none currently scheduled      Is this a Progress Report:     [x]  Yes  []  No        If Yes:  Date Range for reporting period:  Beginning 4/3/24  Ending     Progress report will be due (10 Rx or 30 days whichever is less): 5/3/24        Visit # Insurance Allowable Auth Required   In-person 2024 (6 previously used) 50 []  Yes [x]  No        Functional Scale: LEFI     Date assessed:  7/10/23           LEFI     Date assessed:  8/15/23         LEFI     Date assessed:  23         LEFI  82% Disability        10/30/23         LEFI  72% disability          23         LEFI:  69% Disability         1/10/24         LEFI:  42% Disability         4/3/24      Number of Comorbidities:  [x]0     []1-2    []3+    Latex Allergy:  [x]NO      []YES  Preferred Language for Healthcare:   [x]English       []other:      Pain level:  0-1/10     SUBJECTIVE:  Pt states that she has fallen off of doing as much at home as she should be but she is going to recommit to that    OBJECTIVE:   Observation: significant TTP along 4th ray - no perceived instability with

## 2024-04-05 NOTE — PROGRESS NOTES
CHIEF COMPLAINT: Left foot pain/ 5th MT shaft minimally displaced fracture.    DATE OF INJURY:  8/3/2023, DOT 8/4/2023    HISTORY:  Ms. Joshi 53 y.o.   female  presents today for f/u evaluation of a left foot injury which occurred when she fell.   She is complaining of lateral foot tendon snapping that started 3 weeks ago. She only feels this when walking slow.  When she walks fast, she does not feel the snapping.  Prior to this she was doing really well rates pain a 0/10 VAS. No other complaint. She was seen 1st at [facility], where she was x-rayed and splinted and asked to f/u with orthopaedics. She is a NP for 10 years. She will have a revision ACL on 9/15/2023 by Dr Nunez after was done at .    Past Medical History:   Diagnosis Date    Fractures L 5th metatarsal    PONV (postoperative nausea and vomiting)     Thyroid disease        Past Surgical History:   Procedure Laterality Date    KNEE ARTHROSCOPY W/ ACL RECONSTRUCTION Right     KNEE SURGERY Right 10/27/2023    RIGHT KNEE PIVOT SHIFT UNDER ANESTHESIA; RIGHT KNEE ARTHROSCOPY; ANTERIOR CRUCIATE LIGAMENT RECONSTRUCTION WITH BONE TO BONE AUTOGRAFT; LATERAL EXTRA ARTICULAR TENODESIS;-DEPUY MITEK performed by Jones Nunez MD at [facility] OR       Social History     Socioeconomic History    Marital status: Single     Spouse name: Not on file    Number of children: Not on file    Years of education: Not on file    Highest education level: Not on file   Occupational History    Occupation: nurse practitioner     Employer: [employer]   Tobacco Use    Smoking status: Never    Smokeless tobacco: Never   Vaping Use    Vaping Use: Never used   Substance and Sexual Activity    Alcohol use: Never    Drug use: Never    Sexual activity: Not on file   Other Topics Concern    Not on file   Social History Narrative    Not on file     Social Determinants of Health     Financial Resource Strain: Not on file   Food Insecurity: Not on file   Transportation Needs: Not

## 2024-04-08 ENCOUNTER — APPOINTMENT (OUTPATIENT)
Dept: PHYSICAL THERAPY | Age: 54
End: 2024-04-08
Payer: COMMERCIAL

## 2024-04-10 ENCOUNTER — HOSPITAL ENCOUNTER (OUTPATIENT)
Dept: PHYSICAL THERAPY | Age: 54
Setting detail: THERAPIES SERIES
Discharge: HOME | End: 2024-04-10
Payer: COMMERCIAL

## 2024-04-10 PROCEDURE — 97110 THERAPEUTIC EXERCISES: CPT

## 2024-04-10 NOTE — THERAPY RECERTIFICATION
[facility] Orthopaedic and Sports Medicine [place],  Sports Performance and Rehabilitation, [address] [address]  Phone: [phone]  Fax: [phone]    Physical Therapy Treatment Note:     Date:  4/10/2024    Patient Name:  Eufemia Joshi    :  1970  MRN: [id number]  Restrictions/Precautions:    Medical/Treatment Diagnosis Information:  Old complete tear of anterior cruciate ligament of right knee [M23.51]; Right knee pain, unspecified chronicity [M25.561]         Right knee pain [M25.561]                                        Insurance information: BCBS, 50 hard max; $25  Physician Information:  Jones Nunez MD  Has the plan of care been signed (Y/N):        [x]  Yes  []  No     Date of Patient follow up with Physician: none currently scheduled      Is this a Progress Report:     [x]  Yes  []  No        If Yes:  Date Range for reporting period:  Beginning 4/3/24  Ending     Progress report will be due (10 Rx or 30 days whichever is less): 5/3/24        Visit # Insurance Allowable Auth Required   In-person 2024 (6 previously used) 50 []  Yes [x]  No        Functional Scale: LEFI     Date assessed:  7/10/23           LEFI     Date assessed:  8/15/23         LEFI     Date assessed:  23         LEFI  82% Disability        10/30/23         LEFI  72% disability          23         LEFI:  69% Disability         1/10/24         LEFI:  42% Disability         4/3/24      Number of Comorbidities:  [x]0     []1-2    []3+    Latex Allergy:  [x]NO      []YES  Preferred Language for Healthcare:   [x]English       []other:      Pain level:  0-1/10     SUBJECTIVE:  Pt states that she has gotten back to doing her exercises regularly and feels like they have really helped    OBJECTIVE:   Observation: significant TTP along 4th ray - no perceived instability with

## 2024-04-15 ENCOUNTER — HOSPITAL ENCOUNTER (OUTPATIENT)
Dept: PHYSICAL THERAPY | Age: 54
Setting detail: THERAPIES SERIES
Discharge: HOME | End: 2024-04-15
Payer: COMMERCIAL

## 2024-04-15 PROCEDURE — 97110 THERAPEUTIC EXERCISES: CPT

## 2024-04-15 NOTE — FLOWSHEET NOTE
[facility] Orthopaedic and Sports Medicine [place],  Sports Performance and Rehabilitation, [address] [address]  Phone: [phone]  Fax: [phone]    Physical Therapy Treatment Note:     Date:  4/15/2024    Patient Name:  Eufemia Joshi    :  1970  MRN: [id number]  Restrictions/Precautions:    Medical/Treatment Diagnosis Information:  Old complete tear of anterior cruciate ligament of right knee [M23.51]; Right knee pain, unspecified chronicity [M25.561]         Right knee pain [M25.561]                                        Insurance information: BCBS, 50 hard max; $25  Physician Information:  Jones Nunez MD  Has the plan of care been signed (Y/N):        [x]  Yes  []  No     Date of Patient follow up with Physician: none currently scheduled      Is this a Progress Report:     [x]  Yes  []  No        If Yes:  Date Range for reporting period:  Beginning 4/3/24  Ending     Progress report will be due (10 Rx or 30 days whichever is less): 5/3/24        Visit # Insurance Allowable Auth Required   In-person 2024 (6 previously used) 50 []  Yes [x]  No        Functional Scale: LEFI     Date assessed:  7/10/23           LEFI     Date assessed:  8/15/23         LEFI     Date assessed:  23         LEFI  82% Disability        10/30/23         LEFI  72% disability          23         LEFI:  69% Disability         1/10/24         LEFI:  42% Disability         4/3/24      Number of Comorbidities:  [x]0     []1-2    []3+    Latex Allergy:  [x]NO      []YES  Preferred Language for Healthcare:   [x]English       []other:      Pain level:  0-1/10     SUBJECTIVE:  Pt states that she has gotten back to doing her exercises regularly and feels like they have really helped    OBJECTIVE:   Observation: significant TTP along 4th ray - no perceived instability with

## 2024-04-17 ENCOUNTER — TELEPHONE (OUTPATIENT)
Dept: ORTHOPEDIC SURGERY | Age: 54
End: 2024-04-17

## 2024-04-17 ENCOUNTER — HOSPITAL ENCOUNTER (OUTPATIENT)
Dept: PHYSICAL THERAPY | Age: 54
Setting detail: THERAPIES SERIES
Discharge: HOME | End: 2024-04-17
Payer: COMMERCIAL

## 2024-04-17 PROCEDURE — 97110 THERAPEUTIC EXERCISES: CPT

## 2024-04-17 NOTE — TELEPHONE ENCOUNTER
Status post right knee arthroscopy with revision ACL reconstruction using BTB autograft with lateral extra-articular tenodesis on 10/27/23     PATIENT WANTING HER DISABILITY EXTENDED THROUGH 10/2024.  INTERMITTENT LEAVE TO BE ABLE TO CONTINUE WORKING WITH PT.  I WILL CONFIRM THIS WITH DR. VALENTE PRIOR TO RETURNING PATIENT CALL.

## 2024-04-17 NOTE — FLOWSHEET NOTE
[facility] Orthopaedic and Sports Medicine [place],  Sports Performance and Rehabilitation, [address] [address]  Phone: [phone]  Fax: [phone]    Physical Therapy Treatment Note:     Date:  2024    Patient Name:  Eufemia Joshi    :  1970  MRN: [id number]  Restrictions/Precautions:    Medical/Treatment Diagnosis Information:  Old complete tear of anterior cruciate ligament of right knee [M23.51]; Right knee pain, unspecified chronicity [M25.561]         Right knee pain [M25.561]                                        Insurance information: BCBS, 50 hard max; $25  Physician Information:  Jones Nunez MD  Has the plan of care been signed (Y/N):        [x]  Yes  []  No     Date of Patient follow up with Physician: none currently scheduled      Is this a Progress Report:     [x]  Yes  []  No        If Yes:  Date Range for reporting period:  Beginning 4/3/24  Ending     Progress report will be due (10 Rx or 30 days whichever is less): 5/3/24        Visit # Insurance Allowable Auth Required   In-person 2024 (6 previously used) 50 []  Yes [x]  No        Functional Scale: LEFI     Date assessed:  7/10/23           LEFI     Date assessed:  8/15/23         LEFI     Date assessed:  23         LEFI  82% Disability        10/30/23         LEFI  72% disability          23         LEFI:  69% Disability         1/10/24         LEFI:  42% Disability         4/3/24      Number of Comorbidities:  [x]0     []1-2    []3+    Latex Allergy:  [x]NO      []YES  Preferred Language for Healthcare:   [x]English       []other:      Pain level:  0-1/10     SUBJECTIVE:  Pt states that she feels like her knee is regressing a little bit with her pain, feeling more popping in the back and is a little worried something might be wrong with it    OBJECTIVE:   Observation: significant

## 2024-04-18 ENCOUNTER — TELEPHONE (OUTPATIENT)
Dept: ORTHOPEDIC SURGERY | Age: 54
End: 2024-04-18

## 2024-04-18 NOTE — TELEPHONE ENCOUNTER
Faxed updated fmla (for intermittent time for appts) to Carmen Paulino @ [phone]     Also emailed a copy to the patient @ [email or web address]

## 2024-04-19 ENCOUNTER — HOSPITAL ENCOUNTER (OUTPATIENT)
Dept: PHYSICAL THERAPY | Age: 54
Setting detail: THERAPIES SERIES
Discharge: HOME | End: 2024-04-19
Payer: COMMERCIAL

## 2024-04-19 PROCEDURE — 97110 THERAPEUTIC EXERCISES: CPT

## 2024-04-19 NOTE — FLOWSHEET NOTE
[facility] Orthopaedic and Sports Medicine [place],  Sports Performance and Rehabilitation, [address] [address]  Phone: [phone]  Fax: [phone]    Physical Therapy Treatment Note:     Date:  2024    Patient Name:  Eufemia Joshi    :  1970  MRN: [id number]  Restrictions/Precautions:    Medical/Treatment Diagnosis Information:  Old complete tear of anterior cruciate ligament of right knee [M23.51]; Right knee pain, unspecified chronicity [M25.561]         Right knee pain [M25.561]                                        Insurance information: BCBS, 50 hard max; $25  Physician Information:  Jones Nunez MD  Has the plan of care been signed (Y/N):        [x]  Yes  []  No     Date of Patient follow up with Physician: none currently scheduled      Is this a Progress Report:     []  Yes  [x]  No        If Yes:  Date Range for reporting period:  Beginning 4/3/24  Ending     Progress report will be due (10 Rx or 30 days whichever is less): 5/3/24        Visit # Insurance Allowable Auth Required   In-person 2024 (6 previously used) 50 []  Yes [x]  No        Functional Scale: LEFI     Date assessed:  7/10/23           LEFI     Date assessed:  8/15/23         LEFI     Date assessed:  23         LEFI  82% Disability        10/30/23         LEFI  72% disability          23         LEFI:  69% Disability         1/10/24         LEFI:  42% Disability         4/3/24      Number of Comorbidities:  [x]0     []1-2    []3+    Latex Allergy:  [x]NO      []YES  Preferred Language for Healthcare:   [x]English       []other:      Pain level:  0-1/10     SUBJECTIVE:  Pt states that her knee feels a little better today, has been working a lot on posterior stretching    OBJECTIVE:   Observation: significant TTP along 4th ray - no perceived instability with

## 2024-04-22 ENCOUNTER — HOSPITAL ENCOUNTER (OUTPATIENT)
Dept: PHYSICAL THERAPY | Age: 54
Setting detail: THERAPIES SERIES
Discharge: HOME | End: 2024-04-22
Payer: COMMERCIAL

## 2024-04-22 PROCEDURE — 97110 THERAPEUTIC EXERCISES: CPT

## 2024-04-22 NOTE — FLOWSHEET NOTE
jeronimo.    PLAN: See eval  [x] Continue per plan of care [] Alter current plan (see comments above)  [] Plan of care initiated [] Hold pending MD visit [] Discharge    Electronically signed by  Erin Duran PT, DPT, SCS    Note: If patient does not return for scheduled/ recommended follow up visits, this note will serve as a discharge from care along with most recent update on progress.

## 2024-04-29 ENCOUNTER — HOSPITAL ENCOUNTER (OUTPATIENT)
Dept: PHYSICAL THERAPY | Age: 54
Setting detail: THERAPIES SERIES
Discharge: HOME | End: 2024-04-29
Payer: COMMERCIAL

## 2024-04-29 PROCEDURE — 97110 THERAPEUTIC EXERCISES: CPT

## 2024-04-29 NOTE — FLOWSHEET NOTE
Disability index score of 40% or more per LEFS to assist with reaching prior level of function.   [] Progressing: [x] Met: [] Not Met: [] Adjusted  2. Patient will demonstrate increased AROM to 0-125 to allow for proper joint functioning as indicated by patients Functional Deficits.   [] Progressing: [x] Met: [] Not Met: [] Adjusted  3. Patient will demonstrate an increase in Strength to good proximal hip strength and control, within 20% as tested by biodex in LE to allow for proper functional mobility as indicated by patients Functional Deficits. [x] Progressing: [] Met: [] Not Met: [] Adjusted  4. Patient will return to all ADL's and work related functional activities without increased symptoms or restriction.   [] Progressing: [x] Met: [] Not Met: [] Adjusted    Overall Progression Towards Functional goals/ Treatment Progress Update:  [x] Patient is progressing as expected towards functional goals listed.    [] Progression is slowed due to complexities/Impairments listed.  [] Progression has been slowed due to co-morbidities.  [] Plan just implemented, too soon to assess goals progression <30days   [] Goals require adjustment due to lack of progress  [] Patient is not progressing as expected and requires additional follow up with physician  [] Other    Prognosis for POC: [x] Good [] Fair  [] Poor      Patient requires continued skilled intervention: [x] Yes  [] No    Treatment/Activity Tolerance:  [x] Patient able to complete treatment  [] Patient limited by fatigue  [] Patient limited by pain    [] Patient limited by other medical complications  [] Other:     ASSESSMENT: Eufemia continued to demonstrate improvements in strength and functional extension. She would continue to benefit from skilled physical therapy targeting overall quad strength.    PLAN: See jose  [x] Continue per plan of care [] Alter current plan (see comments above)  [] Plan of care initiated [] Hold pending MD visit []

## 2024-05-01 ENCOUNTER — APPOINTMENT (OUTPATIENT)
Dept: PHYSICAL THERAPY | Age: 54
End: 2024-05-01
Payer: COMMERCIAL

## 2024-05-03 ENCOUNTER — APPOINTMENT (OUTPATIENT)
Dept: PHYSICAL THERAPY | Age: 54
End: 2024-05-03
Payer: COMMERCIAL

## 2024-05-06 ENCOUNTER — HOSPITAL ENCOUNTER (OUTPATIENT)
Dept: PHYSICAL THERAPY | Age: 54
Setting detail: THERAPIES SERIES
Discharge: HOME | End: 2024-05-06
Payer: COMMERCIAL

## 2024-05-06 PROCEDURE — 97110 THERAPEUTIC EXERCISES: CPT

## 2024-05-06 NOTE — FLOWSHEET NOTE
[facility] Orthopaedic and Sports Medicine [place],  Sports Performance and Rehabilitation, [address], [place] [address]  Phone: [phone]  Fax: [phone]    Physical Therapy Treatment Note:     Date:  2024    Patient Name:  Eufemia Joshi    :  1970  MRN: [id number]  Restrictions/Precautions:    Medical/Treatment Diagnosis Information:  Old complete tear of anterior cruciate ligament of right knee [M23.51]; Right knee pain, unspecified chronicity [M25.561]         Right knee pain [M25.561]                                        Insurance information: BCBS, 50 hard max; $25  Physician Information:  Jones Nunez MD  Has the plan of care been signed (Y/N):        [x]  Yes  []  No     Date of Patient follow up with Physician: none currently scheduled      Is this a Progress Report:     [x]  Yes  []  No        If Yes:  Date Range for reporting period:  Beginning 4/3/24  Ending 24    Progress report will be due (10 Rx or 30 days whichever is less): 6/3/24        Visit # Insurance Allowable Auth Required   In-person 2024 (6 previously used) 50 []  Yes [x]  No        Functional Scale: LEFI     Date assessed:  7/10/23           LEFI     Date assessed:  8/15/23         LEFI     Date assessed:  23         LEFI  82% Disability        10/30/23         LEFI  72% disability          23         LEFI:  69% Disability         1/10/24         LEFI:  42% Disability         4/3/24      Number of Comorbidities:  [x]0     []1-2    []3+    Latex Allergy:  [x]NO      []YES  Preferred Language for Healthcare:   [x]English       []other:      Pain level:  0-1/10     SUBJECTIVE:  Pt states that her knee has continued to improve    OBJECTIVE:   Observation: significant TTP along 4th ray - no perceived instability with manual    RESTRICTIONS/PRECAUTIONS: HIGH ESTIMATE PATIENT previous

## 2024-05-08 ENCOUNTER — APPOINTMENT (OUTPATIENT)
Dept: PHYSICAL THERAPY | Age: 54
End: 2024-05-08
Payer: COMMERCIAL

## 2024-05-10 ENCOUNTER — APPOINTMENT (OUTPATIENT)
Dept: PHYSICAL THERAPY | Age: 54
End: 2024-05-10
Payer: COMMERCIAL

## 2024-05-13 ENCOUNTER — HOSPITAL ENCOUNTER (OUTPATIENT)
Dept: PHYSICAL THERAPY | Age: 54
Setting detail: THERAPIES SERIES
Discharge: HOME | End: 2024-05-13
Payer: COMMERCIAL

## 2024-05-13 PROCEDURE — 97110 THERAPEUTIC EXERCISES: CPT

## 2024-05-13 NOTE — FLOWSHEET NOTE
core and proximal hip recruitment and positioning and eccentric body weight control with ambulation re-education including up and down stairs     Home Exercise Program:    [x] (68532) Reviewed/Progressed HEP activities related to strengthening, flexibility, endurance, ROM of core, proximal hip and LE for functional self-care, mobility, lifting and ambulation/stair navigation   [] (89855)Reviewed/Progressed HEP activities related to improving balance, coordination, kinesthetic sense, posture, motor skill, proprioception of core, proximal hip and LE for self care, mobility, lifting, and ambulation/stair navigation      Manual Treatments:  PROM / STM / Oscillations-Mobs:  G-I, II, III, IV (PA's, Inf., Post.)  [] (99464) Provided manual therapy to mobilize LE, proximal hip and/or LS spine soft tissue/joints for the purpose of modulating pain, promoting relaxation,  increasing ROM, reducing/eliminating soft tissue swelling/inflammation/restriction, improving soft tissue extensibility and allowing for proper ROM for normal function with self care, mobility, lifting and ambulation.     Charges  Timed Code Treatment Minutes: 40   Total Treatment Minutes: 40       [x] TE(65301) x 3    [] IONTO  [] NMR (21926) x    [] VASO  [] Manual (59102) x      [] Other:  Gait training   [] TA x      [] Mech Traction (24062)  [] ES(attended) (56365)      [] ES (un) (40293):     GOALS:  Patient stated goal: To get full use of her knee back.  [] Progressing: [x] Met: [] Not Met: [] Adjusted    Therapist goals for Patient:   Short Term Goals: To be achieved in: 2 weeks  1. Independent in HEP and progression per patient tolerance, in order to prevent re-injury.   [] Progressing: [x] Met: [] Not Met: [] Adjusted  2. Patient will have a decrease in pain to facilitate improvement in movement, function, and ADLs as indicated by Functional Deficits.  [] Progressing: [x] Met: [] Not Met: [] Adjusted    Long Term Goals: To be achieved in: 12

## 2024-05-15 ENCOUNTER — APPOINTMENT (OUTPATIENT)
Dept: PHYSICAL THERAPY | Age: 54
End: 2024-05-15
Payer: COMMERCIAL

## 2024-05-17 ENCOUNTER — APPOINTMENT (OUTPATIENT)
Dept: PHYSICAL THERAPY | Age: 54
End: 2024-05-17
Payer: COMMERCIAL

## 2024-05-20 ENCOUNTER — HOSPITAL ENCOUNTER (OUTPATIENT)
Dept: PHYSICAL THERAPY | Age: 54
Setting detail: THERAPIES SERIES
Discharge: HOME | End: 2024-05-20
Payer: COMMERCIAL

## 2024-05-20 PROCEDURE — 97110 THERAPEUTIC EXERCISES: CPT

## 2024-05-20 NOTE — FLOWSHEET NOTE
[facility] Orthopaedic and Sports Medicine [place],  Sports Performance and Rehabilitation, [address] [address]  Phone: [phone]  Fax: [phone]    Physical Therapy Treatment Note:     Date:  2024    Patient Name:  Eufemia Joshi    :  1970  MRN: [id number]  Restrictions/Precautions:    Medical/Treatment Diagnosis Information:  Old complete tear of anterior cruciate ligament of right knee [M23.51]; Right knee pain, unspecified chronicity [M25.561]         Right knee pain [M25.561]                                        Insurance information: BCBS, 50 hard max; $25  Physician Information:  Jones Nunez MD  Has the plan of care been signed (Y/N):        [x]  Yes  []  No     Date of Patient follow up with Physician: none currently scheduled      Is this a Progress Report:     []  Yes  [x]  No        If Yes:  Date Range for reporting period:  Beginning 24  Ending     Progress report will be due (10 Rx or 30 days whichever is less): 6/3/24        Visit # Insurance Allowable Auth Required   In-person 2024 (6 previously used) 50 []  Yes [x]  No        Functional Scale: LEFI     Date assessed:  7/10/23           LEFI     Date assessed:  8/15/23         LEFI     Date assessed:  23         LEFI  82% Disability        10/30/23         LEFI  72% disability          23         LEFI:  69% Disability         1/10/24         LEFI:  42% Disability         4/3/24      Number of Comorbidities:  [x]0     []1-2    []3+    Latex Allergy:  [x]NO      []YES  Preferred Language for Healthcare:   [x]English       []other:      Pain level:  0-1/10     SUBJECTIVE:  Pt states that her knee has continued to improve, was able to walk 2 miles yesterday without any discomfort    OBJECTIVE:   Observation: significant TTP along 4th ray - no perceived instability with

## 2024-06-03 ENCOUNTER — HOSPITAL ENCOUNTER (OUTPATIENT)
Dept: PHYSICAL THERAPY | Age: 54
Setting detail: THERAPIES SERIES
Discharge: HOME | End: 2024-06-03
Payer: COMMERCIAL

## 2024-06-03 PROCEDURE — 97110 THERAPEUTIC EXERCISES: CPT

## 2024-06-03 NOTE — FLOWSHEET NOTE
[facility] Orthopaedic and Sports Medicine [place],  Sports Performance and Rehabilitation, [address] [address]  Phone: [phone]  Fax: [phone]    Physical Therapy Treatment Note:     Date:  6/3/2024    Patient Name:  Eufemia Joshi    :  1970  MRN: [id number]  Restrictions/Precautions:    Medical/Treatment Diagnosis Information:  Old complete tear of anterior cruciate ligament of right knee [M23.51]; Right knee pain, unspecified chronicity [M25.561]         Right knee pain [M25.561]                                        Insurance information: BCBS, 50 hard max; $25  Physician Information:  Jones Nunez MD  Has the plan of care been signed (Y/N):        [x]  Yes  []  No     Date of Patient follow up with Physician: none currently scheduled      Is this a Progress Report:     []  Yes  [x]  No        If Yes:  Date Range for reporting period:  Beginning 24  Ending     Progress report will be due (10 Rx or 30 days whichever is less): 6/3/24        Visit # Insurance Allowable Auth Required   In-person 2024 (6 previously used) 50 []  Yes [x]  No        Functional Scale: LEFI     Date assessed:  7/10/23           LEFI     Date assessed:  8/15/23         LEFI     Date assessed:  23         LEFI  82% Disability        10/30/23         LEFI  72% disability          23         LEFI:  69% Disability         1/10/24         LEFI:  42% Disability         4/3/24      Number of Comorbidities:  [x]0     []1-2    []3+    Latex Allergy:  [x]NO      []YES  Preferred Language for Healthcare:   [x]English       []other:      Pain level:  0-1/10     SUBJECTIVE:  Pt states that her knee has continued to improve, she is up to 8k steps a day without issue, still having some discomfort with max open chain extension    OBJECTIVE:   Observation: significant TTP along 4th ray - no

## 2024-06-10 ENCOUNTER — HOSPITAL ENCOUNTER (OUTPATIENT)
Dept: PHYSICAL THERAPY | Age: 54
Setting detail: THERAPIES SERIES
Discharge: HOME | End: 2024-06-10
Payer: COMMERCIAL

## 2024-06-10 ENCOUNTER — OFFICE VISIT (OUTPATIENT)
Dept: ORTHOPEDIC SURGERY | Age: 54
End: 2024-06-10
Payer: COMMERCIAL

## 2024-06-10 VITALS — WEIGHT: 126 LBS | BODY MASS INDEX: 19.78 KG/M2 | HEIGHT: 67 IN

## 2024-06-10 DIAGNOSIS — G89.18 POST-OPERATIVE PAIN: Primary | ICD-10-CM

## 2024-06-10 PROCEDURE — 97110 THERAPEUTIC EXERCISES: CPT

## 2024-06-10 PROCEDURE — 99213 OFFICE O/P EST LOW 20 MIN: CPT | Performed by: ORTHOPAEDIC SURGERY

## 2024-06-10 NOTE — FLOWSHEET NOTE
[facility] Orthopaedic and Sports Medicine [place],  Sports Performance and Rehabilitation, [address] [address]  Phone: [phone]  Fax: [phone]    Physical Therapy Treatment Note:     Date:  6/10/2024    Patient Name:  Eufemia Joshi    :  1970  MRN: [id number]  Restrictions/Precautions:    Medical/Treatment Diagnosis Information:  Old complete tear of anterior cruciate ligament of right knee [M23.51]; Right knee pain, unspecified chronicity [M25.561]         Right knee pain [M25.561]                                        Insurance information: BCBS, 50 hard max; $25  Physician Information:  Jones Nunez MD  Has the plan of care been signed (Y/N):        [x]  Yes  []  No     Date of Patient follow up with Physician: none currently scheduled      Is this a Progress Report:     [x]  Yes  []  No        If Yes:  Date Range for reporting period:  Beginning 24  Ending 6/10/24    Progress report will be due (10 Rx or 30 days whichever is less): 7/10/24        Visit # Insurance Allowable Auth Required   In-person 2024 (6 previously used) 50 []  Yes [x]  No        Functional Scale: LEFI     Date assessed:  7/10/23           LEFI     Date assessed:  8/15/23         LEFI     Date assessed:  23         LEFI  82% Disability        10/30/23         LEFI  72% disability          23         LEFI:  69% Disability         1/10/24         LEFI:  42% Disability         4/3/24         LEFI: 5364 17% disability         6/10/24      Number of Comorbidities:  [x]0     []1-2    []3+    Latex Allergy:  [x]NO      []YES  Preferred Language for Healthcare:   [x]English       []other:      Pain level:  0-1/10     SUBJECTIVE:  Pt states that she had a good follow up with MD, mutually agreed that she did not need biodex test, is not hoping to get back to any high level dynamic

## 2024-06-17 ENCOUNTER — APPOINTMENT (OUTPATIENT)
Dept: PHYSICAL THERAPY | Age: 54
End: 2024-06-17
Payer: COMMERCIAL

## 2024-06-24 ENCOUNTER — HOSPITAL ENCOUNTER (OUTPATIENT)
Dept: PHYSICAL THERAPY | Age: 54
Setting detail: THERAPIES SERIES
Discharge: HOME | End: 2024-06-24
Payer: COMMERCIAL

## 2024-06-24 PROCEDURE — 97140 MANUAL THERAPY 1/> REGIONS: CPT

## 2024-06-24 PROCEDURE — 97110 THERAPEUTIC EXERCISES: CPT

## 2024-06-24 NOTE — FLOWSHEET NOTE
[facility] [place],  Sports Performance and Rehabilitation, [address] [address]  Phone: [phone]  Fax: [phone]    Physical Therapy Treatment Note:     Date:  2024    Patient Name:  Eufemia Joshi    :  1970  MRN: [id number]  Restrictions/Precautions:    Medical/Treatment Diagnosis Information:  Old complete tear of anterior cruciate ligament of right knee [M23.51]; Right knee pain, unspecified chronicity [M25.561]         Right knee pain [M25.561]                                        Insurance information: BCBS, 50 hard max; $25  Physician Information:  [place] Nunez MD  Has the plan of care been signed (Y/N):        [x]  Yes  []  No     Date of Patient follow up with Physician: none currently scheduled      Is this a Progress Report:     []  Yes  [x]  No        If Yes:  Date Range for reporting period:  Beginning 6/10/24  Ending     Progress report will be due (10 Rx or 30 days whichever is less): 7/10/24        Visit # Insurance Allowable Auth Required   In-person 2024 (6 previously used) 50 []  Yes [x]  No        Functional Scale: LEFI     Date assessed:  7/10/23           LEFI     Date assessed:  8/15/23         LEFI     Date assessed:  23         LEFI  82% Disability        10/30/23         LEFI  72% disability          23         LEFI:  69% Disability         1/10/24         LEFI:  42% Disability         4/3/24         LEFI:  17% disability         6/10/24         LEFI: 56 12% Disability          24      Number of Comorbidities:  [x]0     []1-2    []3+    Latex Allergy:  [x]NO      []YES  Preferred Language for Healthcare:   [x]English       []other:      Pain level:  0-1/10     SUBJECTIVE:  Pt states that she has not really had any issues with her knee over the last few weeks, she feels like she is

## 2024-06-25 NOTE — PROGRESS NOTES
6/10/2024     Reason for visit:  Status post right knee arthroscopy with revision ACL reconstruction using BTB autograft with lateral extra-articular tenodesis on 10/27/23    History of Present Illness:  The patient was in for postoperative evaluation.  Overall she is doing well and feels that she is improving.  She denies any fever or chills.  No numbness or tingling.  She has been compliant with physical therapy.     Objective:  Ht 1.702 m (5' 7\")   Wt 57.2 kg (126 lb)   BMI 19.73 kg/m²      Physical Exam:  The patient is well-appearing and in no apparent distress  Examination of the right knee   There is a small effusion, no gross deformity or skin changes  Range of motion reveals 0 to 125  Grade 1A lachman, negative posterior drawer, no pain or laxity with varus or valgus stress at 0 degrees and 30 degrees of flexion  no joint line tenderness  5 out of 5 strength throughout distal muscle groups  Sensation is intact to light touch throughout all distributions  There is no calf swelling or tenderness  Palpable DP pulse, brisk cap refill, 2+ symmetric reflexes     Imaging:  AP lateral x-ray of the right knee was obtained the office today on 11/2023 and reviewed.  Postop changes consistent with ACL reconstruction.  There is no fracture or dislocation.    Assessment:  Status post right knee arthroscopy with revision ACL reconstruction using BTB autograft with lateral extra-articular tenodesis on 10/27/23    Plan:  The patient is doing well.  At this point she will return to see us 1 last time at the [facility] year [facility] in October.    Greater than 20 minutes were spent with this encounter.  Time spent included evaluating the patient's chart prior to arrival.  Evaluating the patient in the office including history, physical examination, imaging reviewing, and counseling on next steps.  Lastly, time was spent discussing orders with my staff as well as providing documentation in the chart.                    Jones Nunez

## 2025-03-17 ENCOUNTER — OFFICE VISIT (OUTPATIENT)
Dept: ORTHOPEDIC SURGERY | Age: 55
End: 2025-03-17
Payer: COMMERCIAL

## 2025-03-17 VITALS — BODY MASS INDEX: 19.78 KG/M2 | HEIGHT: 67 IN | WEIGHT: 126 LBS

## 2025-03-17 DIAGNOSIS — M25.552 LEFT HIP PAIN: Primary | ICD-10-CM

## 2025-03-17 DIAGNOSIS — M16.12 PRIMARY OSTEOARTHRITIS OF LEFT HIP: ICD-10-CM

## 2025-03-17 PROCEDURE — 99214 OFFICE O/P EST MOD 30 MIN: CPT | Performed by: ORTHOPAEDIC SURGERY

## 2025-03-17 NOTE — PROGRESS NOTES
Health,  Health,  Health,  Health, [facility], [facility]    Yearly Questionnaire     Do you need any assistance with obtaining housing, meals, medication, transportation or medical equipment?: No     Assistance needed for:: Not on file   Transportation Needs: No Transportation Needs (6/10/2024)    Received from  [facility],  [facility],  [facility], [facility], [facility], [facility]    Yearly Questionnaire     Do you need any assistance with obtaining housing, meals, medication, transportation or medical equipment?: No     Assistance needed for:: Not on file   Physical Activity: Inactive (1/18/2024)    Exercise Vital Sign     Days of Exercise per Week: 0 days     Minutes of Exercise per Session: 0 min   Stress: Not on file   Social Connections: Not on file   Intimate Partner Violence: Unknown (1/20/2024)    Received from [facility] and [facility]    Interpersonal Safety     Feel physically or emotionally unsafe where currently live: Not on file     Harm by anyone: Not on file     Emotionally Harmed: Not on file   Housing Stability: No Transportation Needs (6/10/2024)    Received from  [facility],  [facility], [facility], [facility], [facility], [facility]    Yearly Questionnaire     Do you need any assistance with obtaining housing, meals, medication, transportation or medical equipment?: No     Assistance needed for:: Not on file      Current Outpatient Medications on File Prior to Visit   Medication Sig Dispense Refill    vitamin D (ERGOCALCIFEROL) 1.25 MG (89683 UT) CAPS capsule Take 1 capsule by mouth once a week 12 capsule 1    NP THYROID 60 MG tablet TAKE 1 TABLET BY MOUTH EVERY DAY      Cholecalciferol (VITAMIN D3) 50 MCG (2000 UT) TABS TAKE 1 TABLET BY MOUTH EVERY DAY       No current facility-administered medications on file prior to visit.      Allergies   Allergen Reactions    Penicillins Rash and Other (See Comments)     PCN-YEAST INFECTION AND RASH; TOLERATES KEFLEX        Review of

## 2025-03-28 ENCOUNTER — HOSPITAL ENCOUNTER (OUTPATIENT)
Dept: PHYSICAL THERAPY | Age: 55
Setting detail: THERAPIES SERIES
Discharge: HOME | End: 2025-03-28
Attending: ORTHOPAEDIC SURGERY
Payer: COMMERCIAL

## 2025-03-28 PROCEDURE — 97110 THERAPEUTIC EXERCISES: CPT

## 2025-03-28 PROCEDURE — 97161 PT EVAL LOW COMPLEX 20 MIN: CPT

## 2025-03-28 NOTE — THERAPY EVALUATION
weeks  1Independent in HEP and progression per patient tolerance, in order to prevent re-injury.   [] Progressing: [] Met: [] Not Met: [] Adjusted  Patient will have a decrease in pain to <2/10 to facilitate improvement in movement, function, and ADLs as indicated by Functional Deficits.  [] Progressing: [] Met: [] Not Met: [] Adjusted    IF APPLICABLE:  [] Patient to demonstrate independence in wear and care for custom orthotic device. (Only if applicable for orthotic eval)     Long Term Goals: To be achieved in: 6 weeks  Disability index score of 6% or less for the IHOT Hip outcome tool to assist with reaching prior level of function with activities such as ambulation.  [] Progressing: [] Met: [] Not Met: [] Adjusted  Patient will demonstrate increased Strength of hip flexors and knee extensors to at least 4+/5 throughout without pain to allow for proper functional mobility to enable patient to return to all ADL's with less pain.   [] Progressing: [] Met: [] Not Met: [] Adjusted  Patient will return to all work related tasks without increased symptoms or restriction.   [] Progressing: [] Met: [] Not Met: [] Adjusted      Overall Progression Towards Functional goals/ Treatment Progress Update:  [] Patient is progressing as expected towards functional goals listed.    [] Progression is slowed due to complexities/Impairments listed.  [] Progression has been slowed due to co-morbidities.  [x] Plan just implemented, too soon (<30days) to assess goals progression   [] Goals require adjustment due to lack of progress  [] Patient is not progressing as expected and requires additional follow up with physician  [] Other:     TREATMENT PLAN     Frequency/Duration: 1-2x/week for 4-6 weeks for the following treatment interventions:    Interventions:  Therapeutic Exercise (66628) including: strength training, ROM, and functional mobility  Therapeutic Activities (03857) including: functional mobility training and

## 2025-04-21 NOTE — FLOWSHEET NOTE
[facility] [place],  Sports Performance and Rehabilitation, [address], [place] [address]  Phone: [phone]  Fax: [phone]    Physical Therapy Treatment Note:           Date:  3/18/2024    Patient Name:  Eufemia Joshi    :  1970  MRN: [id number]  Restrictions/Precautions:    Medical/Treatment Diagnosis Information:  Old complete tear of anterior cruciate ligament of right knee [M23.51]; Right knee pain, unspecified chronicity [M25.561]         Right knee pain [M25.561]                                        Insurance information: BCBS, 50 hard max; $25  Physician Information:  [place] Nunez MD  Has the plan of care been signed (Y/N):        [x]  Yes  []  No     Date of Patient follow up with Physician: 24      Is this a Progress Report:     []  Yes  [x]  No        If Yes:  Date Range for reporting period:  Beginning 24  Ending     Progress report will be due (10 Rx or 30 days whichever is less): 3/26/24        Visit # Insurance Allowable Auth Required   In-person 2024 (6 previously used) 50 []  Yes [x]  No        Functional Scale: LEFI     Date assessed:  7/10/23           LEFI     Date assessed:  8/15/23         LEFI     Date assessed:  23         LEFI  82% Disability        10/30/23         LEFI  72% disability          23         LEFI:  69% Disability         1/10/24      Number of Comorbidities:  [x]0     []1-2    []3+    Latex Allergy:  [x]NO      []YES  Preferred Language for Healthcare:   [x]English       []other:      Pain level:  0-1/10     SUBJECTIVE:  Pt states her knee feels good today.   OBJECTIVE:   Observation: Pt ambulates with even WB on BLE and has equal time in stance phases of R and L leg.   Test measurements:  Knee Ext to 0; Flex to 85 (heel slide)           Knee ext 0°; Flex 105 (Heel slide) = 23         
rash

## 2025-08-01 ENCOUNTER — APPOINTMENT (OUTPATIENT)
Dept: GENERAL RADIOLOGY | Age: 55
End: 2025-08-01
Payer: COMMERCIAL

## 2025-08-01 ENCOUNTER — HOSPITAL ENCOUNTER (EMERGENCY)
Age: 55
Discharge: HOME | End: 2025-08-01
Attending: EMERGENCY MEDICINE
Payer: COMMERCIAL

## 2025-08-01 VITALS
TEMPERATURE: 98 F | HEART RATE: 74 BPM | RESPIRATION RATE: 16 BRPM | HEIGHT: 66 IN | WEIGHT: 126.98 LBS | OXYGEN SATURATION: 100 % | DIASTOLIC BLOOD PRESSURE: 92 MMHG | SYSTOLIC BLOOD PRESSURE: 144 MMHG | BODY MASS INDEX: 20.41 KG/M2

## 2025-08-01 DIAGNOSIS — L03.011 CELLULITIS OF RIGHT INDEX FINGER: Primary | ICD-10-CM

## 2025-08-01 PROCEDURE — 6370000000 HC RX 637 (ALT 250 FOR IP): Performed by: EMERGENCY MEDICINE

## 2025-08-01 PROCEDURE — 73140 X-RAY EXAM OF FINGER(S): CPT

## 2025-08-01 PROCEDURE — 99283 EMERGENCY DEPT VISIT LOW MDM: CPT

## 2025-08-01 RX ORDER — CEPHALEXIN 500 MG/1
500 CAPSULE ORAL ONCE
Status: COMPLETED | OUTPATIENT
Start: 2025-08-01 | End: 2025-08-01

## 2025-08-01 RX ORDER — TERIPARATIDE 250 UG/ML
20 INJECTION, SOLUTION SUBCUTANEOUS DAILY
COMMUNITY

## 2025-08-01 RX ORDER — CEPHALEXIN 500 MG/1
500 CAPSULE ORAL 3 TIMES DAILY
Qty: 21 CAPSULE | Refills: 0 | Status: SHIPPED | OUTPATIENT
Start: 2025-08-01 | End: 2025-08-08

## 2025-08-01 RX ADMIN — CEPHALEXIN 500 MG: 500 CAPSULE ORAL at 19:29

## 2025-08-01 ASSESSMENT — PAIN - FUNCTIONAL ASSESSMENT: PAIN_FUNCTIONAL_ASSESSMENT: NONE - DENIES PAIN

## 2025-08-01 NOTE — ED PROVIDER NOTES
[facility] EMERGENCY DEPT VISIT      Patient Identification  Eufemia Joshi is a 54 y.o. female.    Chief Complaint   Hand Injury (Pt wake up yesterday with right index finger swollen, today the swelling got worse, pt denies injury to finger.)      History of Present Illness:    History was obtained from patient.  Limitations to history:none  This is a  54 y.o. female who presents ambulatory  to the ED with complaints of 2 day h/o atraumatic right dominant index finger pain and swelling. No known injury. No known foreign body. Does recall possible mosquito bite to distal middle phalanx area about a week ago that itched but was not painful. Local wheel at the time but now painful and more swollen spreading down finger. No pain with flexion and extension. Pain mostly to middle and proximal volar finger..     Past Medical History:   Diagnosis Date    Fractures L 5th metatarsal    Osteoporosis     PONV (postoperative nausea and vomiting)     Thyroid disease        Past Surgical History:   Procedure Laterality Date    KNEE ARTHROSCOPY W/ ACL RECONSTRUCTION Right     KNEE SURGERY Right 10/27/2023    RIGHT KNEE PIVOT SHIFT UNDER ANESTHESIA; RIGHT KNEE ARTHROSCOPY; ANTERIOR CRUCIATE LIGAMENT RECONSTRUCTION WITH BONE TO BONE AUTOGRAFT; LATERAL EXTRA ARTICULAR TENODESIS;-DEPUY MITEK performed by Jones Nunez MD at [facility] OR       No current facility-administered medications for this encounter.    Current Outpatient Medications:     Teriparatide (FORTEO) 560 MCG/2.24ML SOPN injection, Inject 0.08 mLs into the skin daily, Disp: , Rfl:     cephALEXin (KEFLEX) 500 MG capsule, Take 1 capsule by mouth 3 times daily for 7 days, Disp: 21 capsule, Rfl: 0    NP THYROID 60 MG tablet, TAKE 1 TABLET BY MOUTH EVERY DAY, Disp: , Rfl:     Cholecalciferol (VITAMIN D3) 50 MCG (2000 UT) TABS, TAKE 1 TABLET BY MOUTH EVERY DAY, Disp: , Rfl:     vitamin D (ERGOCALCIFEROL) 1.25 MG (32324 UT) CAPS capsule, Take 1 capsule by mouth once a week (Patient

## 2025-08-01 NOTE — DISCHARGE INSTRUCTIONS
Return for fever, increased redness, increased swelling, pain with joint movement, streaks up hand, blistering.

## 2025-08-01 NOTE — ED TRIAGE NOTES
Pt wake up yesterday with right index finger swollen, today the swelling got worse, pt denies injury to finger.

## 2025-08-05 ENCOUNTER — OFFICE VISIT (OUTPATIENT)
Dept: ORTHOPEDIC SURGERY | Age: 55
End: 2025-08-05

## 2025-08-05 VITALS — BODY MASS INDEX: 20.25 KG/M2 | HEIGHT: 66 IN | WEIGHT: 126 LBS

## 2025-08-05 DIAGNOSIS — M65.949 FLEXOR TENOSYNOVITIS OF FINGER: Primary | ICD-10-CM

## 2025-08-05 RX ORDER — TRIAMCINOLONE ACETONIDE 40 MG/ML
20 INJECTION, SUSPENSION INTRA-ARTICULAR; INTRAMUSCULAR ONCE
Status: COMPLETED | OUTPATIENT
Start: 2025-08-05 | End: 2025-08-05

## 2025-08-05 RX ORDER — LIDOCAINE HYDROCHLORIDE 10 MG/ML
0.5 INJECTION, SOLUTION INFILTRATION; PERINEURAL ONCE
Status: COMPLETED | OUTPATIENT
Start: 2025-08-05 | End: 2025-08-05

## 2025-08-05 RX ADMIN — TRIAMCINOLONE ACETONIDE 20 MG: 40 INJECTION, SUSPENSION INTRA-ARTICULAR; INTRAMUSCULAR at 12:32

## 2025-08-05 RX ADMIN — LIDOCAINE HYDROCHLORIDE 0.5 ML: 10 INJECTION, SOLUTION INFILTRATION; PERINEURAL at 12:31

## 2025-08-27 ENCOUNTER — OFFICE VISIT (OUTPATIENT)
Dept: ORTHOPEDIC SURGERY | Age: 55
End: 2025-08-27
Payer: COMMERCIAL

## 2025-08-27 VITALS — HEIGHT: 66 IN | WEIGHT: 126 LBS | BODY MASS INDEX: 20.25 KG/M2

## 2025-08-27 DIAGNOSIS — M65.949 FLEXOR TENOSYNOVITIS OF FINGER: Primary | ICD-10-CM

## 2025-08-27 PROCEDURE — 99213 OFFICE O/P EST LOW 20 MIN: CPT | Performed by: ORTHOPAEDIC SURGERY

## (undated) DEVICE — PADDING CAST W6INXL4YD NONSTERILE COT RAYON MICROPLEATED

## (undated) DEVICE — ADHESIVE LIQ 2OZ ADJUNCT FOR DSG MASTISOL

## (undated) DEVICE — INTENDED FOR TISSUE SEPARATION, AND OTHER PROCEDURES THAT REQUIRE A SHARP SURGICAL BLADE TO PUNCTURE OR CUT.: Brand: BARD-PARKER ® STAINLESS STEEL BLADES

## (undated) DEVICE — INTENT TO BE USED WITH SUTURE MATERIAL FOR TISSUE CLOSURE: Brand: RICHARD-ALLAN® NEEDLE 1/2 CIRCLE TAPER

## (undated) DEVICE — PADDING CAST W4INXL4YD ST COT RAYON MICROPLEATED HIGHLY

## (undated) DEVICE — GOWN SIRUS NONREIN XL W/TWL: Brand: MEDLINE INDUSTRIES, INC.

## (undated) DEVICE — DRILL BIT 2.0MM (5/64'') X 128.0MM

## (undated) DEVICE — SHEET,DRAPE,53X77,STERILE: Brand: MEDLINE

## (undated) DEVICE — SUTURE VCRL SZ 0 L36IN ABSRB UD CT-1 L36MM 1/2 CIR TAPR PNT VCP946H

## (undated) DEVICE — Device

## (undated) DEVICE — STRIP,CLOSURE,WOUND,MEDI-STRIP,1/2X4: Brand: MEDLINE

## (undated) DEVICE — PRECISION THIN (9.0 X 0.38 X 31.0MM)

## (undated) DEVICE — KIT ACL ANT CRUCE LIG CANN RUL PEN DRL PIN GWIRE RETRCT

## (undated) DEVICE — TOWEL,OR,DSP,ST,BLUE,STD,4/PK,20PK/CS: Brand: MEDLINE

## (undated) DEVICE — WEREWOLF FLOW 90 COBLATION WAND: Brand: COBLATION

## (undated) DEVICE — SUTURE VCRL + SZ 2-0 L18IN ABSRB VLT L26MM SH 1/2 CIR VCP775D

## (undated) DEVICE — PAD ABSRB W8XL10IN ABD HYDROPHOBIC NONWOVEN THCK LAYR CELOS

## (undated) DEVICE — SOLUTION IRRIG 3000ML 0.9% SOD CHL USP UROMATIC PLAS CONT

## (undated) DEVICE — GLOVE ORANGE PI 7 1/2   MSG9075

## (undated) DEVICE — GLOVE SURG SZ 75 L12IN FNGR THK79MIL GRN LTX FREE

## (undated) DEVICE — COVER,MAYO STAND,STERILE: Brand: MEDLINE

## (undated) DEVICE — 4.0 MM ELITE ABRADER STRAIGHT                                    DISPOSABLE BURRS, AQUA, 10000                                    MAXIMUM RPM, PACKAGED 6 PER BOX, STERILE

## (undated) DEVICE — TUBING, SUCTION, 1/4" X 10', STRAIGHT: Brand: MEDLINE

## (undated) DEVICE — NEEDLE SPNL L3.5IN PNK HUB S STL REG WALL FIT STYL W/ QNCKE

## (undated) DEVICE — BNDG,ELSTC,MATRIX,STRL,6"X5YD,LF,HOOK&LP: Brand: MEDLINE

## (undated) DEVICE — 3M™ STERI-DRAPE™ ARTHROSCOPY SHEET WITH POUCH 1194: Brand: STERI-DRAPE™

## (undated) DEVICE — DEVON TUBE HOLDER REMOVABLE TOUCH FASTEN STRAP: Brand: DEVON

## (undated) DEVICE — GLOVE ORANGE PI 8   MSG9080

## (undated) DEVICE — SUTURE FIBERWIRE SZ 2 W/ TAPERED NEEDLE BLUE L38IN NONABSORB BLU L26.5MM 1/2 CIRCLE AR7200

## (undated) DEVICE — SPONGE LAP W18XL18IN WHT COT 4 PLY FLD STRUNG RADPQ DISP ST 2 PER PACK

## (undated) DEVICE — SPIKED WASHER 14MM

## (undated) DEVICE — Q-FIX REUSABLE 2.8MM PATHFINDER OBTURATOR: Brand: Q-FIX

## (undated) DEVICE — BIO-COMP SWIVELOCK SP, 5.5X24.5MM
Type: IMPLANTABLE DEVICE | Site: KNEE | Status: NON-FUNCTIONAL
Brand: ARTHREX®
Removed: 2023-10-27

## (undated) DEVICE — DRESSING,GAUZE,XEROFORM,CURAD,1"X8",ST: Brand: CURAD

## (undated) DEVICE — GOWN,PREVENTION PLUS,XLN/XL,ST,24/CS: Brand: MEDLINE

## (undated) DEVICE — SUTURE VCRL + 1 L27IN ABSRB UD CT-1 L36MM 1/2 CIR TAPR PNT VCP261H

## (undated) DEVICE — SUTURE VCRL + SZ 2-0 L36IN ABSRB UD L36MM CT-1 1/2 CIR VCP945H

## (undated) DEVICE — GAUZE,SPONGE,4"X4",8PLY,STRL,LF,10/TRAY: Brand: MEDLINE

## (undated) DEVICE — 4.5 MM FULL RADIUS ELITE STRAIGHT                                    DISPOSABLE BLADES, MAROON,PACKAGED 6                                    PER BOX, STERILE

## (undated) DEVICE — MARKER,SKIN,WI/RULER AND LABELS: Brand: MEDLINE

## (undated) DEVICE — PENCIL ES L3M BTTN SWCH S STL HEX LOK BLDE ELECTRD HOLSTER

## (undated) DEVICE — CONTAINER,SPECIMEN,OR STERILE,4OZ: Brand: MEDLINE

## (undated) DEVICE — SUTURE VCRL + SZ 1 L18IN ABSRB UD L36MM CT-1 1/2 CIR VCP841D

## (undated) DEVICE — SUTURE MCRYL + SZ 4-0 L27IN ABSRB UD L19MM PS-2 3/8 CIR MCP426H

## (undated) DEVICE — GLOVE SURG SZ 8 L12IN FNGR THK79MIL GRN LTX FREE

## (undated) DEVICE — LIGHT HANDLE: Brand: DEVON

## (undated) DEVICE — ZIMMER® STERILE DISPOSABLE TOURNIQUET CUFF WITH PLC, DUAL PORT, SINGLE BLADDER, 34 IN. (86 CM)

## (undated) DEVICE — COVER,TABLE,77X90,STERILE: Brand: MEDLINE

## (undated) DEVICE — APPLICATOR MEDICATED 26 CC SOLUTION HI LT ORNG CHLORAPREP

## (undated) DEVICE — KNIFE SURG 10MM GRFT DISP FOR ACL RECON

## (undated) DEVICE — ANCHOR SUT L19.1MM DIA5.5MM BIOCOMPOSITE FULL THRD KNOTLESS
Type: IMPLANTABLE DEVICE | Site: KNEE | Status: NON-FUNCTIONAL
Removed: 2023-10-27

## (undated) DEVICE — DYONICS 25 INFLOW TUBE SET, 3 PER BOX

## (undated) DEVICE — SUTURE VCRL + SZ 2-0 L18IN ABSRB UD CT1 L36MM 1/2 CIR VCP839D

## (undated) DEVICE — HYPODERMIC SAFETY NEEDLE: Brand: MAGELLAN